# Patient Record
Sex: MALE | Race: WHITE | Employment: UNEMPLOYED | URBAN - METROPOLITAN AREA
[De-identification: names, ages, dates, MRNs, and addresses within clinical notes are randomized per-mention and may not be internally consistent; named-entity substitution may affect disease eponyms.]

---

## 2021-09-15 ENCOUNTER — HOSPITAL ENCOUNTER (INPATIENT)
Age: 61
LOS: 6 days | Discharge: NURSING, REHAB OR LONG-TERM CARE | DRG: 480 | End: 2021-09-21
Attending: EMERGENCY MEDICINE | Admitting: INTERNAL MEDICINE
Payer: COMMERCIAL

## 2021-09-15 ENCOUNTER — APPOINTMENT (OUTPATIENT)
Dept: GENERAL RADIOLOGY | Age: 61
DRG: 480 | End: 2021-09-15
Payer: COMMERCIAL

## 2021-09-15 DIAGNOSIS — S72.001A CLOSED FRACTURE OF NECK OF RIGHT FEMUR, INITIAL ENCOUNTER (HCC): Primary | ICD-10-CM

## 2021-09-15 DIAGNOSIS — G89.18 POST-OP PAIN: ICD-10-CM
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
## 2021-09-15 PROBLEM — I10 HTN (HYPERTENSION): Status: ACTIVE | Noted: 2021-09-15

## 2021-09-15 PROBLEM — C61 PROSTATE CANCER (HCC): Status: ACTIVE | Noted: 2021-09-15
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
## 2021-09-15 LAB
ABO/RH: NORMAL
ALBUMIN SERPL-MCNC: 4.4 G/DL (ref 3.5–4.6)
ALP BLD-CCNC: 114 U/L (ref 35–104)
ALT SERPL-CCNC: 25 U/L (ref 0–41)
ANION GAP SERPL CALCULATED.3IONS-SCNC: 12 MEQ/L (ref 9–15)
ANTIBODY SCREEN: NORMAL
AST SERPL-CCNC: 27 U/L (ref 0–40)
BASOPHILS ABSOLUTE: 0 K/UL (ref 0–0.2)
BASOPHILS RELATIVE PERCENT: 0.3 %
BILIRUB SERPL-MCNC: 0.3 MG/DL (ref 0.2–0.7)
BUN BLDV-MCNC: 15 MG/DL (ref 8–23)
CALCIUM SERPL-MCNC: 9.3 MG/DL (ref 8.5–9.9)
CHLORIDE BLD-SCNC: 104 MEQ/L (ref 95–107)
CO2: 25 MEQ/L (ref 20–31)
CREAT SERPL-MCNC: 0.91 MG/DL (ref 0.7–1.2)
EOSINOPHILS ABSOLUTE: 0.1 K/UL (ref 0–0.7)
EOSINOPHILS RELATIVE PERCENT: 0.6 %
GFR AFRICAN AMERICAN: >60
GFR NON-AFRICAN AMERICAN: >60
GLOBULIN: 2.6 G/DL (ref 2.3–3.5)
GLUCOSE BLD-MCNC: 112 MG/DL (ref 70–99)
HCT VFR BLD CALC: 42.3 % (ref 42–52)
HEMOGLOBIN: 14.1 G/DL (ref 14–18)
INR BLD: 1
LYMPHOCYTES ABSOLUTE: 1.9 K/UL (ref 1–4.8)
LYMPHOCYTES RELATIVE PERCENT: 13 %
MCH RBC QN AUTO: 30.3 PG (ref 27–31.3)
MCHC RBC AUTO-ENTMCNC: 33.4 % (ref 33–37)
MCV RBC AUTO: 90.8 FL (ref 80–100)
MONOCYTES ABSOLUTE: 1 K/UL (ref 0.2–0.8)
MONOCYTES RELATIVE PERCENT: 6.6 %
NEUTROPHILS ABSOLUTE: 11.9 K/UL (ref 1.4–6.5)
NEUTROPHILS RELATIVE PERCENT: 79.5 %
PDW BLD-RTO: 12.6 % (ref 11.5–14.5)
PLATELET # BLD: 260 K/UL (ref 130–400)
POTASSIUM SERPL-SCNC: 4.2 MEQ/L (ref 3.4–4.9)
PROTHROMBIN TIME: 13.3 SEC (ref 12.3–14.9)
RBC # BLD: 4.66 M/UL (ref 4.7–6.1)
SODIUM BLD-SCNC: 141 MEQ/L (ref 135–144)
TOTAL PROTEIN: 7 G/DL (ref 6.3–8)
WBC # BLD: 15 K/UL (ref 4.8–10.8)

## 2021-09-15 PROCEDURE — 99284 EMERGENCY DEPT VISIT MOD MDM: CPT

## 2021-09-15 PROCEDURE — 93005 ELECTROCARDIOGRAM TRACING: CPT | Performed by: EMERGENCY MEDICINE

## 2021-09-15 PROCEDURE — 80053 COMPREHEN METABOLIC PANEL: CPT

## 2021-09-15 PROCEDURE — 36415 COLL VENOUS BLD VENIPUNCTURE: CPT

## 2021-09-15 PROCEDURE — 73502 X-RAY EXAM HIP UNI 2-3 VIEWS: CPT

## 2021-09-15 PROCEDURE — 71045 X-RAY EXAM CHEST 1 VIEW: CPT

## 2021-09-15 PROCEDURE — 73552 X-RAY EXAM OF FEMUR 2/>: CPT

## 2021-09-15 PROCEDURE — 85610 PROTHROMBIN TIME: CPT

## 2021-09-15 PROCEDURE — 6360000002 HC RX W HCPCS: Performed by: EMERGENCY MEDICINE

## 2021-09-15 PROCEDURE — 85025 COMPLETE CBC W/AUTO DIFF WBC: CPT

## 2021-09-15 PROCEDURE — 96374 THER/PROPH/DIAG INJ IV PUSH: CPT

## 2021-09-15 PROCEDURE — 96375 TX/PRO/DX INJ NEW DRUG ADDON: CPT

## 2021-09-15 PROCEDURE — 86850 RBC ANTIBODY SCREEN: CPT

## 2021-09-15 PROCEDURE — 86900 BLOOD TYPING SEROLOGIC ABO: CPT

## 2021-09-15 PROCEDURE — 1210000000 HC MED SURG R&B

## 2021-09-15 PROCEDURE — 86901 BLOOD TYPING SEROLOGIC RH(D): CPT

## 2021-09-15 PROCEDURE — 2580000003 HC RX 258: Performed by: EMERGENCY MEDICINE

## 2021-09-15 RX ORDER — ONDANSETRON 2 MG/ML
4 INJECTION INTRAMUSCULAR; INTRAVENOUS ONCE
Status: COMPLETED | OUTPATIENT
Start: 2021-09-15 | End: 2021-09-15

## 2021-09-15 RX ORDER — SODIUM CHLORIDE 0.9 % (FLUSH) 0.9 %
5-40 SYRINGE (ML) INJECTION PRN
Status: DISCONTINUED | OUTPATIENT
Start: 2021-09-15 | End: 2021-09-21 | Stop reason: HOSPADM

## 2021-09-15 RX ORDER — SODIUM CHLORIDE 9 MG/ML
25 INJECTION, SOLUTION INTRAVENOUS PRN
Status: DISCONTINUED | OUTPATIENT
Start: 2021-09-15 | End: 2021-09-21 | Stop reason: HOSPADM

## 2021-09-15 RX ORDER — MORPHINE SULFATE 2 MG/ML
2 INJECTION, SOLUTION INTRAMUSCULAR; INTRAVENOUS
Status: DISCONTINUED | OUTPATIENT
Start: 2021-09-15 | End: 2021-09-21 | Stop reason: HOSPADM

## 2021-09-15 RX ORDER — ONDANSETRON 4 MG/1
4 TABLET, ORALLY DISINTEGRATING ORAL EVERY 8 HOURS PRN
Status: DISCONTINUED | OUTPATIENT
Start: 2021-09-15 | End: 2021-09-21 | Stop reason: HOSPADM

## 2021-09-15 RX ORDER — MORPHINE SULFATE 4 MG/ML
6 INJECTION, SOLUTION INTRAMUSCULAR; INTRAVENOUS ONCE
Status: COMPLETED | OUTPATIENT
Start: 2021-09-15 | End: 2021-09-15

## 2021-09-15 RX ORDER — SODIUM CHLORIDE 0.9 % (FLUSH) 0.9 %
5-40 SYRINGE (ML) INJECTION EVERY 12 HOURS SCHEDULED
Status: DISCONTINUED | OUTPATIENT
Start: 2021-09-15 | End: 2021-09-21 | Stop reason: HOSPADM

## 2021-09-15 RX ORDER — POLYETHYLENE GLYCOL 3350 17 G/17G
17 POWDER, FOR SOLUTION ORAL DAILY PRN
Status: DISCONTINUED | OUTPATIENT
Start: 2021-09-15 | End: 2021-09-21 | Stop reason: HOSPADM

## 2021-09-15 RX ORDER — 0.9 % SODIUM CHLORIDE 0.9 %
1000 INTRAVENOUS SOLUTION INTRAVENOUS ONCE
Status: COMPLETED | OUTPATIENT
Start: 2021-09-15 | End: 2021-09-15

## 2021-09-15 RX ORDER — ONDANSETRON 2 MG/ML
4 INJECTION INTRAMUSCULAR; INTRAVENOUS EVERY 6 HOURS PRN
Status: DISCONTINUED | OUTPATIENT
Start: 2021-09-15 | End: 2021-09-21 | Stop reason: HOSPADM

## 2021-09-15 RX ADMIN — MORPHINE SULFATE 6 MG: 4 INJECTION INTRAVENOUS at 20:33

## 2021-09-15 RX ADMIN — SODIUM CHLORIDE 1000 ML: 9 INJECTION, SOLUTION INTRAVENOUS at 19:21

## 2021-09-15 RX ADMIN — ONDANSETRON 4 MG: 2 INJECTION INTRAMUSCULAR; INTRAVENOUS at 19:21

## 2021-09-15 RX ADMIN — MORPHINE SULFATE 6 MG: 4 INJECTION INTRAVENOUS at 19:22

## 2021-09-15 ASSESSMENT — PAIN SCALES - GENERAL
PAINLEVEL_OUTOF10: 10
PAINLEVEL_OUTOF10: 1
PAINLEVEL_OUTOF10: 10
PAINLEVEL_OUTOF10: 2

## 2021-09-15 ASSESSMENT — PAIN DESCRIPTION - ONSET: ONSET: ON-GOING

## 2021-09-15 ASSESSMENT — ENCOUNTER SYMPTOMS
DIARRHEA: 0
ABDOMINAL PAIN: 0
WHEEZING: 0
TROUBLE SWALLOWING: 0
CHEST TIGHTNESS: 0
EYES NEGATIVE: 1
NAUSEA: 0
CONSTIPATION: 0
SORE THROAT: 0
SHORTNESS OF BREATH: 0
COUGH: 0
VOMITING: 0

## 2021-09-15 ASSESSMENT — PAIN DESCRIPTION - FREQUENCY: FREQUENCY: INTERMITTENT

## 2021-09-15 ASSESSMENT — PAIN DESCRIPTION - LOCATION: LOCATION: HIP;GROIN

## 2021-09-15 ASSESSMENT — PAIN DESCRIPTION - PAIN TYPE: TYPE: ACUTE PAIN

## 2021-09-15 ASSESSMENT — PAIN DESCRIPTION - PROGRESSION: CLINICAL_PROGRESSION: NOT CHANGED

## 2021-09-15 NOTE — Clinical Note
Patient Class: Inpatient [101]   REQUIRED: Diagnosis: Fall at home, initial encounter [[id number]]   Estimated Length of Stay: Estimated stay of more than 2 midnights   Admitting Provider: Nish Mullins [[id number]]   Telemetry/Cardiac Monitoring Required?: Yes

## 2021-09-16 ENCOUNTER — ANESTHESIA (OUTPATIENT)
Dept: OPERATING ROOM | Age: 61
DRG: 480 | End: 2021-09-16
Payer: COMMERCIAL

## 2021-09-16 ENCOUNTER — APPOINTMENT (OUTPATIENT)
Dept: GENERAL RADIOLOGY | Age: 61
DRG: 480 | End: 2021-09-16
Payer: COMMERCIAL

## 2021-09-16 ENCOUNTER — ANESTHESIA EVENT (OUTPATIENT)
Dept: OPERATING ROOM | Age: 61
DRG: 480 | End: 2021-09-16
Payer: COMMERCIAL

## 2021-09-16 VITALS — OXYGEN SATURATION: 90 % | TEMPERATURE: 90 F | SYSTOLIC BLOOD PRESSURE: 134 MMHG | DIASTOLIC BLOOD PRESSURE: 65 MMHG

## 2021-09-16 LAB
EKG ATRIAL RATE: 89 BPM
EKG P AXIS: 47 DEGREES
EKG P-R INTERVAL: 136 MS
EKG Q-T INTERVAL: 400 MS
EKG QRS DURATION: 142 MS
EKG QTC CALCULATION (BAZETT): 486 MS
EKG R AXIS: 80 DEGREES
EKG T AXIS: 43 DEGREES
EKG VENTRICULAR RATE: 89 BPM
SARS-COV-2, NAAT: NOT DETECTED

## 2021-09-16 PROCEDURE — C1713 ANCHOR/SCREW BN/BN,TIS/BN: HCPCS | Performed by: ORTHOPAEDIC SURGERY

## 2021-09-16 PROCEDURE — 6360000002 HC RX W HCPCS: Performed by: STUDENT IN AN ORGANIZED HEALTH CARE EDUCATION/TRAINING PROGRAM

## 2021-09-16 PROCEDURE — 93010 ELECTROCARDIOGRAM REPORT: CPT | Performed by: INTERNAL MEDICINE

## 2021-09-16 PROCEDURE — 6370000000 HC RX 637 (ALT 250 FOR IP): Performed by: ORTHOPAEDIC SURGERY

## 2021-09-16 PROCEDURE — 6360000002 HC RX W HCPCS: Performed by: NURSE PRACTITIONER

## 2021-09-16 PROCEDURE — 3700000000 HC ANESTHESIA ATTENDED CARE: Performed by: ORTHOPAEDIC SURGERY

## 2021-09-16 PROCEDURE — 3209999900 FLUORO FOR SURGICAL PROCEDURES

## 2021-09-16 PROCEDURE — 2580000003 HC RX 258: Performed by: STUDENT IN AN ORGANIZED HEALTH CARE EDUCATION/TRAINING PROGRAM

## 2021-09-16 PROCEDURE — 3E0T3BZ INTRODUCTION OF ANESTHETIC AGENT INTO PERIPHERAL NERVES AND PLEXI, PERCUTANEOUS APPROACH: ICD-10-PCS | Performed by: STUDENT IN AN ORGANIZED HEALTH CARE EDUCATION/TRAINING PROGRAM

## 2021-09-16 PROCEDURE — 87635 SARS-COV-2 COVID-19 AMP PRB: CPT

## 2021-09-16 PROCEDURE — 3600000004 HC SURGERY LEVEL 4 BASE: Performed by: ORTHOPAEDIC SURGERY

## 2021-09-16 PROCEDURE — 3600000014 HC SURGERY LEVEL 4 ADDTL 15MIN: Performed by: ORTHOPAEDIC SURGERY

## 2021-09-16 PROCEDURE — 76942 ECHO GUIDE FOR BIOPSY: CPT | Performed by: STUDENT IN AN ORGANIZED HEALTH CARE EDUCATION/TRAINING PROGRAM

## 2021-09-16 PROCEDURE — C1769 GUIDE WIRE: HCPCS | Performed by: ORTHOPAEDIC SURGERY

## 2021-09-16 PROCEDURE — 0QH604Z INSERTION OF INTERNAL FIXATION DEVICE INTO RIGHT UPPER FEMUR, OPEN APPROACH: ICD-10-PCS | Performed by: ORTHOPAEDIC SURGERY

## 2021-09-16 PROCEDURE — 94150 VITAL CAPACITY TEST: CPT

## 2021-09-16 PROCEDURE — 99221 1ST HOSP IP/OBS SF/LOW 40: CPT | Performed by: ORTHOPAEDIC SURGERY

## 2021-09-16 PROCEDURE — 2500000003 HC RX 250 WO HCPCS: Performed by: STUDENT IN AN ORGANIZED HEALTH CARE EDUCATION/TRAINING PROGRAM

## 2021-09-16 PROCEDURE — 27236 TREAT THIGH FRACTURE: CPT | Performed by: ORTHOPAEDIC SURGERY

## 2021-09-16 PROCEDURE — 2580000003 HC RX 258: Performed by: NURSE PRACTITIONER

## 2021-09-16 PROCEDURE — 7100000001 HC PACU RECOVERY - ADDTL 15 MIN: Performed by: ORTHOPAEDIC SURGERY

## 2021-09-16 PROCEDURE — 2709999900 HC NON-CHARGEABLE SUPPLY: Performed by: ORTHOPAEDIC SURGERY

## 2021-09-16 PROCEDURE — 3700000001 HC ADD 15 MINUTES (ANESTHESIA): Performed by: ORTHOPAEDIC SURGERY

## 2021-09-16 PROCEDURE — 2580000003 HC RX 258: Performed by: ORTHOPAEDIC SURGERY

## 2021-09-16 PROCEDURE — 1210000000 HC MED SURG R&B

## 2021-09-16 PROCEDURE — 7100000000 HC PACU RECOVERY - FIRST 15 MIN: Performed by: ORTHOPAEDIC SURGERY

## 2021-09-16 DEVICE — SCREW BONE L44MM DIA5MM TI ALLOY LCK ST W/ T25 STARDRV: Type: IMPLANTABLE DEVICE | Site: FEMUR | Status: FUNCTIONAL

## 2021-09-16 DEVICE — PLATE BONE 1 H TI ALLOY FOR FEM NK SYS: Type: IMPLANTABLE DEVICE | Site: FEMUR | Status: FUNCTIONAL

## 2021-09-16 DEVICE — SCREW BONE L100MM DIA6.4MM BLU TI ALLOY ANTIROTATION T25: Type: IMPLANTABLE DEVICE | Site: FEMUR | Status: FUNCTIONAL

## 2021-09-16 RX ORDER — ONDANSETRON 2 MG/ML
4 INJECTION INTRAMUSCULAR; INTRAVENOUS
Status: DISCONTINUED | OUTPATIENT
Start: 2021-09-16 | End: 2021-09-16 | Stop reason: HOSPADM

## 2021-09-16 RX ORDER — HYDROCODONE BITARTRATE AND ACETAMINOPHEN 5; 325 MG/1; MG/1
2 TABLET ORAL PRN
Status: DISCONTINUED | OUTPATIENT
Start: 2021-09-16 | End: 2021-09-16 | Stop reason: HOSPADM

## 2021-09-16 RX ORDER — ROPIVACAINE HYDROCHLORIDE 5 MG/ML
INJECTION, SOLUTION EPIDURAL; INFILTRATION; PERINEURAL
Status: COMPLETED | OUTPATIENT
Start: 2021-09-16 | End: 2021-09-16

## 2021-09-16 RX ORDER — SODIUM CHLORIDE 0.9 % (FLUSH) 0.9 %
10 SYRINGE (ML) INJECTION EVERY 12 HOURS SCHEDULED
Status: DISCONTINUED | OUTPATIENT
Start: 2021-09-16 | End: 2021-09-16 | Stop reason: HOSPADM

## 2021-09-16 RX ORDER — SODIUM CHLORIDE 9 MG/ML
25 INJECTION, SOLUTION INTRAVENOUS PRN
Status: DISCONTINUED | OUTPATIENT
Start: 2021-09-16 | End: 2021-09-21 | Stop reason: HOSPADM

## 2021-09-16 RX ORDER — SODIUM CHLORIDE 0.9 % (FLUSH) 0.9 %
10 SYRINGE (ML) INJECTION EVERY 12 HOURS SCHEDULED
Status: DISCONTINUED | OUTPATIENT
Start: 2021-09-16 | End: 2021-09-21 | Stop reason: HOSPADM

## 2021-09-16 RX ORDER — ACETAMINOPHEN 325 MG/1
650 TABLET ORAL EVERY 6 HOURS
Status: DISCONTINUED | OUTPATIENT
Start: 2021-09-16 | End: 2021-09-21 | Stop reason: HOSPADM

## 2021-09-16 RX ORDER — OXYCODONE HYDROCHLORIDE 5 MG/1
5 TABLET ORAL EVERY 4 HOURS PRN
Status: DISCONTINUED | OUTPATIENT
Start: 2021-09-16 | End: 2021-09-17

## 2021-09-16 RX ORDER — DIPHENHYDRAMINE HYDROCHLORIDE 50 MG/ML
12.5 INJECTION INTRAMUSCULAR; INTRAVENOUS
Status: DISCONTINUED | OUTPATIENT
Start: 2021-09-16 | End: 2021-09-16 | Stop reason: HOSPADM

## 2021-09-16 RX ORDER — SODIUM CHLORIDE 0.9 % (FLUSH) 0.9 %
10 SYRINGE (ML) INJECTION PRN
Status: DISCONTINUED | OUTPATIENT
Start: 2021-09-16 | End: 2021-09-16 | Stop reason: HOSPADM

## 2021-09-16 RX ORDER — MEPERIDINE HYDROCHLORIDE 25 MG/ML
12.5 INJECTION INTRAMUSCULAR; INTRAVENOUS; SUBCUTANEOUS EVERY 5 MIN PRN
Status: DISCONTINUED | OUTPATIENT
Start: 2021-09-16 | End: 2021-09-16 | Stop reason: HOSPADM

## 2021-09-16 RX ORDER — METOCLOPRAMIDE HYDROCHLORIDE 5 MG/ML
10 INJECTION INTRAMUSCULAR; INTRAVENOUS
Status: DISCONTINUED | OUTPATIENT
Start: 2021-09-16 | End: 2021-09-16 | Stop reason: HOSPADM

## 2021-09-16 RX ORDER — SODIUM CHLORIDE 9 MG/ML
25 INJECTION, SOLUTION INTRAVENOUS PRN
Status: DISCONTINUED | OUTPATIENT
Start: 2021-09-16 | End: 2021-09-16 | Stop reason: HOSPADM

## 2021-09-16 RX ORDER — PROPOFOL 10 MG/ML
INJECTION, EMULSION INTRAVENOUS PRN
Status: DISCONTINUED | OUTPATIENT
Start: 2021-09-16 | End: 2021-09-16 | Stop reason: SDUPTHER

## 2021-09-16 RX ORDER — SODIUM CHLORIDE, SODIUM LACTATE, POTASSIUM CHLORIDE, CALCIUM CHLORIDE 600; 310; 30; 20 MG/100ML; MG/100ML; MG/100ML; MG/100ML
INJECTION, SOLUTION INTRAVENOUS CONTINUOUS PRN
Status: DISCONTINUED | OUTPATIENT
Start: 2021-09-16 | End: 2021-09-16 | Stop reason: SDUPTHER

## 2021-09-16 RX ORDER — LIDOCAINE HYDROCHLORIDE 10 MG/ML
1 INJECTION, SOLUTION EPIDURAL; INFILTRATION; INTRACAUDAL; PERINEURAL
Status: DISCONTINUED | OUTPATIENT
Start: 2021-09-16 | End: 2021-09-16 | Stop reason: HOSPADM

## 2021-09-16 RX ORDER — PREDNISONE 1 MG/1
5 TABLET ORAL DAILY
Status: DISCONTINUED | OUTPATIENT
Start: 2021-09-16 | End: 2021-09-21 | Stop reason: HOSPADM

## 2021-09-16 RX ORDER — SODIUM CHLORIDE, SODIUM LACTATE, POTASSIUM CHLORIDE, CALCIUM CHLORIDE 600; 310; 30; 20 MG/100ML; MG/100ML; MG/100ML; MG/100ML
INJECTION, SOLUTION INTRAVENOUS CONTINUOUS
Status: DISPENSED | OUTPATIENT
Start: 2021-09-16 | End: 2021-09-17

## 2021-09-16 RX ORDER — PREDNISONE 1 MG/1
5 TABLET ORAL DAILY
COMMUNITY

## 2021-09-16 RX ORDER — FENTANYL CITRATE 50 UG/ML
50 INJECTION, SOLUTION INTRAMUSCULAR; INTRAVENOUS EVERY 10 MIN PRN
Status: DISCONTINUED | OUTPATIENT
Start: 2021-09-16 | End: 2021-09-16 | Stop reason: HOSPADM

## 2021-09-16 RX ORDER — MIDAZOLAM HYDROCHLORIDE 1 MG/ML
INJECTION INTRAMUSCULAR; INTRAVENOUS PRN
Status: DISCONTINUED | OUTPATIENT
Start: 2021-09-16 | End: 2021-09-16 | Stop reason: SDUPTHER

## 2021-09-16 RX ORDER — ABIRATERONE ACETATE 250 MG/1
1000 TABLET ORAL DAILY
Status: DISCONTINUED | OUTPATIENT
Start: 2021-09-16 | End: 2021-09-21 | Stop reason: HOSPADM

## 2021-09-16 RX ORDER — SENNA AND DOCUSATE SODIUM 50; 8.6 MG/1; MG/1
1 TABLET, FILM COATED ORAL 2 TIMES DAILY
Status: DISCONTINUED | OUTPATIENT
Start: 2021-09-16 | End: 2021-09-21 | Stop reason: HOSPADM

## 2021-09-16 RX ORDER — SODIUM CHLORIDE 0.9 % (FLUSH) 0.9 %
10 SYRINGE (ML) INJECTION PRN
Status: DISCONTINUED | OUTPATIENT
Start: 2021-09-16 | End: 2021-09-21 | Stop reason: HOSPADM

## 2021-09-16 RX ORDER — BUPIVACAINE HYDROCHLORIDE 5 MG/ML
INJECTION, SOLUTION EPIDURAL; INTRACAUDAL PRN
Status: DISCONTINUED | OUTPATIENT
Start: 2021-09-16 | End: 2021-09-16 | Stop reason: SDUPTHER

## 2021-09-16 RX ORDER — SODIUM CHLORIDE, SODIUM LACTATE, POTASSIUM CHLORIDE, CALCIUM CHLORIDE 600; 310; 30; 20 MG/100ML; MG/100ML; MG/100ML; MG/100ML
INJECTION, SOLUTION INTRAVENOUS CONTINUOUS
Status: DISCONTINUED | OUTPATIENT
Start: 2021-09-16 | End: 2021-09-16

## 2021-09-16 RX ORDER — SODIUM CHLORIDE, SODIUM LACTATE, POTASSIUM CHLORIDE, CALCIUM CHLORIDE 600; 310; 30; 20 MG/100ML; MG/100ML; MG/100ML; MG/100ML
INJECTION, SOLUTION INTRAVENOUS
Status: COMPLETED
Start: 2021-09-16 | End: 2021-09-16

## 2021-09-16 RX ORDER — ABIRATERONE ACETATE 250 MG/1
250 TABLET ORAL DAILY
COMMUNITY

## 2021-09-16 RX ORDER — HYDROCODONE BITARTRATE AND ACETAMINOPHEN 5; 325 MG/1; MG/1
1 TABLET ORAL PRN
Status: DISCONTINUED | OUTPATIENT
Start: 2021-09-16 | End: 2021-09-16 | Stop reason: HOSPADM

## 2021-09-16 RX ADMIN — SODIUM CHLORIDE, POTASSIUM CHLORIDE, SODIUM LACTATE AND CALCIUM CHLORIDE: 600; 310; 30; 20 INJECTION, SOLUTION INTRAVENOUS at 20:31

## 2021-09-16 RX ADMIN — MORPHINE SULFATE 2 MG: 2 INJECTION, SOLUTION INTRAMUSCULAR; INTRAVENOUS at 01:50

## 2021-09-16 RX ADMIN — ACETAMINOPHEN 650 MG: 325 TABLET ORAL at 20:30

## 2021-09-16 RX ADMIN — BUPIVACAINE HYDROCHLORIDE 10 MG: 5 INJECTION, SOLUTION EPIDURAL; INTRACAUDAL at 15:31

## 2021-09-16 RX ADMIN — ONDANSETRON 4 MG: 2 INJECTION INTRAMUSCULAR; INTRAVENOUS at 01:50

## 2021-09-16 RX ADMIN — ROPIVACAINE HYDROCHLORIDE 20 ML: 5 INJECTION, SOLUTION EPIDURAL; INFILTRATION; PERINEURAL at 15:35

## 2021-09-16 RX ADMIN — MIDAZOLAM HYDROCHLORIDE 2 MG: 2 INJECTION, SOLUTION INTRAMUSCULAR; INTRAVENOUS at 15:25

## 2021-09-16 RX ADMIN — PROPOFOL 50 MCG/KG/MIN: 10 INJECTION, EMULSION INTRAVENOUS at 15:43

## 2021-09-16 RX ADMIN — PROPOFOL 50 MG: 10 INJECTION, EMULSION INTRAVENOUS at 15:25

## 2021-09-16 RX ADMIN — CEFAZOLIN 3000 MG: 10 INJECTION, POWDER, FOR SOLUTION INTRAVENOUS at 15:35

## 2021-09-16 RX ADMIN — SODIUM CHLORIDE, PRESERVATIVE FREE 10 ML: 5 INJECTION INTRAVENOUS at 20:30

## 2021-09-16 RX ADMIN — SODIUM CHLORIDE, POTASSIUM CHLORIDE, SODIUM LACTATE AND CALCIUM CHLORIDE: 600; 310; 30; 20 INJECTION, SOLUTION INTRAVENOUS at 15:26

## 2021-09-16 RX ADMIN — DOCUSATE SODIUM 50 MG AND SENNOSIDES 8.6 MG 1 TABLET: 8.6; 5 TABLET, FILM COATED ORAL at 20:30

## 2021-09-16 RX ADMIN — MEPERIDINE HYDROCHLORIDE 12.5 MG: 25 INJECTION, SOLUTION INTRAMUSCULAR; INTRAVENOUS; SUBCUTANEOUS at 17:34

## 2021-09-16 ASSESSMENT — PULMONARY FUNCTION TESTS
PIF_VALUE: 1
PIF_VALUE: 0
PIF_VALUE: 1
PIF_VALUE: 0
PIF_VALUE: 1
PIF_VALUE: 0
PIF_VALUE: 1
PIF_VALUE: 0
PIF_VALUE: 1
PIF_VALUE: 0
PIF_VALUE: 1
PIF_VALUE: 0
PIF_VALUE: 1
PIF_VALUE: 0
PIF_VALUE: 1
PIF_VALUE: 1
PIF_VALUE: 0
PIF_VALUE: 1
PIF_VALUE: 0
PIF_VALUE: 1

## 2021-09-16 ASSESSMENT — PAIN SCALES - GENERAL
PAINLEVEL_OUTOF10: 0
PAINLEVEL_OUTOF10: 0
PAINLEVEL_OUTOF10: 3

## 2021-09-16 NOTE — ANESTHESIA PRE PROCEDURE
Department of Anesthesiology  Preprocedure Note       Name:  Randal Cody   Age:  59 y.o.  :  1960                                          MRN:  [id number]         Date:  2021      Surgeon: Bernadette Leyva):  Sedrick Ventura MD    Procedure: Procedure(s): FEMORAL NECK PLATING RIGHT HIP SYNTHES ROOM 267    Medications prior to admission:   Prior to Admission medications    Not on File       Current medications:    Current Facility-Administered Medications   Medication Dose Route Frequency Provider Last Rate Last Admin    lactated ringers infusion             sodium chloride flush 0.9 % injection 5-40 mL  5-40 mL IntraVENous 2 times per day Clover Melissa, APRN - CNP        sodium chloride flush 0.9 % injection 5-40 mL  5-40 mL IntraVENous PRN Clover Melissa, APRN - CNP        0.9 % sodium chloride infusion  25 mL IntraVENous PRN Clover Melissa, APRN - CNP        [Held by provider] enoxaparin (LOVENOX) injection 40 mg  40 mg SubCUTAneous Daily Clover Torres, APRN - CNP        ondansetron (ZOFRAN-ODT) disintegrating tablet 4 mg  4 mg Oral Q8H PRN Clover Melissa, APRN - CNP        Or    ondansetron [facility]) injection 4 mg  4 mg IntraVENous Q6H PRN Clover Melissa, APRN - CNP   4 mg at 21 0150    polyethylene glycol (GLYCOLAX) packet 17 g  17 g Oral Daily PRN Cornell Berman, APRN - CNP        ceFAZolin (ANCEF) 3000 mg in dextrose 5 % 100 mL IVPB  3,000 mg IntraVENous On Call to [address] [place] [address], APRN - CNP        morphine (PF) injection 2 mg  2 mg IntraVENous Q2H PRN [place] Torres, APRN - CNP   2 mg at 21 0150       Allergies:  No Known Allergies    Problem List:    Patient Active Problem List   Diagnosis Code    Fall at home, initial encounter W19. Milagros Conn, Y92.009    Closed fracture of neck of right femur ([facility] [address] S72.001A    Prostate cancer ([facility] [address] C61    HTN (hypertension) I10       Past Medical History: Date    PROTIME 13.3 09/15/2021    INR 1.0 09/15/2021       HCG (If Applicable): No results found for: PREGTESTUR, PREGSERUM, HCG, HCGQUANT     ABGs: No results found for: PHART, PO2ART, GBW8EHJ, ARK3XGI, BEART, J0WRSKBH     Type & Screen (If Applicable):  No results found for: LABABO, LABRH    Drug/Infectious Status (If Applicable):  No results found for: HIV, HEPCAB    COVID-19 Screening (If Applicable):   Lab Results   Component Value Date    COVID19 Not Detected 09/16/2021           Anesthesia Evaluation  Patient summary reviewed and Nursing notes reviewed no history of anesthetic complications:   Airway: Mallampati: II  TM distance: >3 FB   Neck ROM: full  Mouth opening: > = 3 FB Dental: normal exam         Pulmonary:Negative Pulmonary ROS and normal exam                               Cardiovascular:  Exercise tolerance: good (>4 METS),   (+) hypertension:,       ECG reviewed               Beta Blocker:  Not on Beta Blocker      ROS comment: Normal sinus rhythm  Right bundle branch block  Abnormal ECG  No previous ECGs available     Neuro/Psych:   Negative Neuro/Psych ROS              GI/Hepatic/Renal: Neg GI/Hepatic/Renal ROS  (+) morbid obesity         ROS comment: BMI 39. Endo/Other:    (+) malignancy/cancer. Pt had PAT visit. Abdominal:             Vascular: negative vascular ROS. Other Findings:             Anesthesia Plan      spinal and regional     ASA 2 - emergent     (Bupivicaine 0.5% 10 mg   PENG  Discussed risk / benefit of spinal anesthesia versus general anesthesia. Discussed risk of bleeding, bruising, infection and nerve injury.)  Induction: intravenous. MIPS: Prophylactic antiemetics administered. Anesthetic plan and risks discussed with patient. Plan discussed with CRNA.     Attending anesthesiologist reviewed and agrees with Ronn Esparza DO   9/16/2021

## 2021-09-16 NOTE — ANESTHESIA PROCEDURE NOTES
Peripheral Block    Patient location during procedure: pre-op  Start time: 9/16/2021 3:26 PM  End time: 9/16/2021 3:35 PM  Staffing  Performed: anesthesiologist   Anesthesiologist: Yumiko Bey DO  Preanesthetic Checklist  Completed: patient identified, IV checked, site marked, risks and benefits discussed, surgical consent, monitors and equipment checked, pre-op evaluation, timeout performed, anesthesia consent given, oxygen available and patient being monitored  Peripheral Block  Patient position: supine  Prep: ChloraPrep  Patient monitoring: cardiac monitor, continuous pulse ox, frequent blood pressure checks and IV access  Block type: PENG  Laterality: right  Injection technique: single-shot  Guidance: ultrasound guided  Local infiltration: ropivacaine  Infiltration strength: 0.5 %  Dose: 20 mL  Provider prep: mask and sterile gloves (Sterile probe cover)  Local infiltration: ropivacaine  Needle  Needle type: combined needle/nerve stimulator   Needle gauge: 22 G  Needle length: 10 cm  Needle localization: anatomical landmarks and ultrasound guidance  Assessment  Injection assessment: negative aspiration for heme, no paresthesia on injection and local visualized surrounding nerve on ultrasound  Paresthesia pain: immediately resolved  Slow fractionated injection: yes  Hemodynamics: stable  Additional Notes  Ultrasound image printed and saved in patient chart.     Sterile probe cover used    Medications Administered  Ropivacaine (NAROPIN) injection 0.5%, 20 mL  Reason for block: post-op pain management and at surgeon's request

## 2021-09-16 NOTE — PROGRESS NOTES
Dr. Mega Delacruz in to visit with patient. Consent reviewed with patient and written out by Dr. Mega Delacruz. Patient did verbalize procedure and understands.

## 2021-09-16 NOTE — ANESTHESIA PROCEDURE NOTES
Spinal Block    Patient location during procedure: pre-op  Start time: 9/16/2021 3:26 PM  End time: 9/16/2021 3:34 PM  Reason for block: primary anesthetic  Staffing  Performed: anesthesiologist   Anesthesiologist: Yumiko Bey DO  Preanesthetic Checklist  Completed: patient identified, IV checked, site marked, risks and benefits discussed, surgical consent, monitors and equipment checked, pre-op evaluation, timeout performed, anesthesia consent given, oxygen available and patient being monitored  Spinal Block  Patient position: right lateral decubitus  Prep: Betadine  Patient monitoring: cardiac monitor, continuous pulse ox and frequent blood pressure checks  Approach: midline  Location: L4/L5  Provider prep: mask and sterile gloves  Local infiltration: lidocaine  Agent: bupivacaine (Isobaric bupivicain 0.5%)  Dose: 2  Dose: 2  Needle  Needle type: Pencan   Needle gauge: 22 G  Needle length: 3.5 in  Assessment  Sensory level: T6  Events: cerebrospinal fluid  Lysis level: CSF aspirated. CSF: clear  Attempts: 1  Hemodynamics: stable  Additional Notes  Free flowing CSF. Negative heme. Negative paresthesia. Daniel Zepeda
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
## 2021-09-16 NOTE — ANESTHESIA POSTPROCEDURE EVALUATION
Department of Anesthesiology  Postprocedure Note    Patient: Ni Mc  MRN: [id number]  [place]: 1960  Date of evaluation: 9/16/2021  Time:  5:01 PM     Procedure Summary     Date: 09/16/21 Room / Location: [address]    Anesthesia Start: 8767 Anesthesia Stop:     Procedure: FEMORAL NECK PLATING RIGHT HIP SYNTHES ROOM 267 (Right ) Diagnosis: (CLOSED FRACTURE OF NECK RIGHT FEMUR)    Surgeons: Hailey Mcclelland MD Responsible Provider: Boom Coombs DO    Anesthesia Type: spinal, regional ASA Status: 2 - Emergent          Anesthesia Type: spinal, PENG    Marilee Phase I:   10    Marilee Phase II:      Last vitals: Reviewed and per EMR flowsheets.        Anesthesia Post Evaluation    Patient location during evaluation: bedside  Patient participation: complete - patient participated  Level of consciousness: awake and awake and alert  Pain score: 0  Airway patency: patent  Nausea & Vomiting: no nausea and no vomiting  Complications: no  Cardiovascular status: blood pressure returned to baseline and hemodynamically stable  Respiratory status: acceptable  Hydration status: euvolemic
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
## 2021-09-16 NOTE — PROGRESS NOTES
Pt had a spinal and nerve block pt is starting to slightly be able to move his toes, still has decrease sensation, no c/o pain, RLE warm to touch and pulses easily palpated at 3+

## 2021-09-16 NOTE — FLOWSHEET NOTE
1830- Pt arrived from surgery. Wife at bedside. Vitals stable. Right thigh tegaderm intact. Pt able to wiggle his toes and has sensation. Pt denies any pain or complaints. Called for dinner tray    1915- Pt resting in bed comfortably. No distress noted.  Wife at [address] [place] [address] handoff report given to [facility] Electronically signed by Irma Solorio RN on 9/16/2021 at 7:45 PM
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
## 2021-09-17 ENCOUNTER — APPOINTMENT (OUTPATIENT)
Dept: CT IMAGING | Age: 61
DRG: 480 | End: 2021-09-17
Payer: COMMERCIAL

## 2021-09-17 PROBLEM — E78.2 MIXED HYPERLIPIDEMIA: Status: ACTIVE | Noted: 2021-09-17

## 2021-09-17 PROBLEM — C79.51 SECONDARY MALIGNANT NEOPLASM OF BONE (HCC): Status: ACTIVE | Noted: 2019-02-01

## 2021-09-17 PROBLEM — C79.82 METASTATIC ADENOCARCINOMA TO PROSTATE (HCC): Status: ACTIVE | Noted: 2019-04-05

## 2021-09-17 PROBLEM — I10 ESSENTIAL HYPERTENSION: Status: ACTIVE | Noted: 2018-08-01

## 2021-09-17 PROBLEM — R97.20 ELEVATED PROSTATE SPECIFIC ANTIGEN (PSA): Status: ACTIVE | Noted: 2018-07-25

## 2021-09-17 PROBLEM — R26.9 GAIT ABNORMALITY: Status: ACTIVE | Noted: 2021-09-17

## 2021-09-17 LAB
ANION GAP SERPL CALCULATED.3IONS-SCNC: 12 MEQ/L (ref 9–15)
BUN BLDV-MCNC: 13 MG/DL (ref 8–23)
CALCIUM SERPL-MCNC: 8.3 MG/DL (ref 8.5–9.9)
CHLORIDE BLD-SCNC: 96 MEQ/L (ref 95–107)
CO2: 21 MEQ/L (ref 20–31)
CREAT SERPL-MCNC: 0.83 MG/DL (ref 0.7–1.2)
GFR AFRICAN AMERICAN: >60
GFR NON-AFRICAN AMERICAN: >60
GLUCOSE BLD-MCNC: 154 MG/DL (ref 70–99)
HCT VFR BLD CALC: 35.7 % (ref 42–52)
HEMOGLOBIN: 12.2 G/DL (ref 14–18)
MCH RBC QN AUTO: 31.3 PG (ref 27–31.3)
MCHC RBC AUTO-ENTMCNC: 34.1 % (ref 33–37)
MCV RBC AUTO: 91.6 FL (ref 80–100)
PDW BLD-RTO: 12.5 % (ref 11.5–14.5)
PLATELET # BLD: 229 K/UL (ref 130–400)
POTASSIUM SERPL-SCNC: 3.3 MEQ/L (ref 3.4–4.9)
RBC # BLD: 3.89 M/UL (ref 4.7–6.1)
SODIUM BLD-SCNC: 129 MEQ/L (ref 135–144)
WBC # BLD: 12.8 K/UL (ref 4.8–10.8)

## 2021-09-17 PROCEDURE — 85027 COMPLETE CBC AUTOMATED: CPT

## 2021-09-17 PROCEDURE — 2580000003 HC RX 258: Performed by: ORTHOPAEDIC SURGERY

## 2021-09-17 PROCEDURE — XW03396 INTRODUCTION OF CEFTOLOZANE/TAZOBACTAM ANTI-INFECTIVE INTO PERIPHERAL VEIN, PERCUTANEOUS APPROACH, NEW TECHNOLOGY GROUP 6: ICD-10-PCS | Performed by: INTERNAL MEDICINE

## 2021-09-17 PROCEDURE — 6360000002 HC RX W HCPCS: Performed by: ORTHOPAEDIC SURGERY

## 2021-09-17 PROCEDURE — 6370000000 HC RX 637 (ALT 250 FOR IP): Performed by: ORTHOPAEDIC SURGERY

## 2021-09-17 PROCEDURE — 6360000002 HC RX W HCPCS: Performed by: INTERNAL MEDICINE

## 2021-09-17 PROCEDURE — 97163 PT EVAL HIGH COMPLEX 45 MIN: CPT

## 2021-09-17 PROCEDURE — 6370000000 HC RX 637 (ALT 250 FOR IP): Performed by: NURSE PRACTITIONER

## 2021-09-17 PROCEDURE — 2700000000 HC OXYGEN THERAPY PER DAY

## 2021-09-17 PROCEDURE — 97116 GAIT TRAINING THERAPY: CPT

## 2021-09-17 PROCEDURE — 6360000004 HC RX CONTRAST MEDICATION: Performed by: NURSE PRACTITIONER

## 2021-09-17 PROCEDURE — 2580000003 HC RX 258: Performed by: INTERNAL MEDICINE

## 2021-09-17 PROCEDURE — 97535 SELF CARE MNGMENT TRAINING: CPT

## 2021-09-17 PROCEDURE — 1210000000 HC MED SURG R&B

## 2021-09-17 PROCEDURE — 71275 CT ANGIOGRAPHY CHEST: CPT

## 2021-09-17 PROCEDURE — 6370000000 HC RX 637 (ALT 250 FOR IP): Performed by: INTERNAL MEDICINE

## 2021-09-17 PROCEDURE — 36415 COLL VENOUS BLD VENIPUNCTURE: CPT

## 2021-09-17 PROCEDURE — 97166 OT EVAL MOD COMPLEX 45 MIN: CPT

## 2021-09-17 PROCEDURE — 80048 BASIC METABOLIC PNL TOTAL CA: CPT

## 2021-09-17 RX ORDER — OXYCODONE HYDROCHLORIDE 5 MG/1
10 TABLET ORAL EVERY 4 HOURS PRN
Status: DISCONTINUED | OUTPATIENT
Start: 2021-09-17 | End: 2021-09-21 | Stop reason: HOSPADM

## 2021-09-17 RX ORDER — FUROSEMIDE 10 MG/ML
40 INJECTION INTRAMUSCULAR; INTRAVENOUS ONCE
Status: COMPLETED | OUTPATIENT
Start: 2021-09-17 | End: 2021-09-17

## 2021-09-17 RX ORDER — POTASSIUM CHLORIDE 20 MEQ/1
40 TABLET, EXTENDED RELEASE ORAL EVERY 4 HOURS
Status: COMPLETED | OUTPATIENT
Start: 2021-09-17 | End: 2021-09-17

## 2021-09-17 RX ORDER — OXYCODONE HYDROCHLORIDE 5 MG/1
5 TABLET ORAL EVERY 4 HOURS PRN
Status: DISCONTINUED | OUTPATIENT
Start: 2021-09-17 | End: 2021-09-21 | Stop reason: HOSPADM

## 2021-09-17 RX ORDER — TIZANIDINE 4 MG/1
4 TABLET ORAL EVERY 6 HOURS PRN
Status: DISCONTINUED | OUTPATIENT
Start: 2021-09-17 | End: 2021-09-21 | Stop reason: HOSPADM

## 2021-09-17 RX ADMIN — ACETAMINOPHEN 650 MG: 325 TABLET ORAL at 00:36

## 2021-09-17 RX ADMIN — DOCUSATE SODIUM 50 MG AND SENNOSIDES 8.6 MG 1 TABLET: 8.6; 5 TABLET, FILM COATED ORAL at 09:19

## 2021-09-17 RX ADMIN — ACETAMINOPHEN 650 MG: 325 TABLET ORAL at 06:21

## 2021-09-17 RX ADMIN — OXYCODONE HYDROCHLORIDE 5 MG: 5 TABLET ORAL at 09:19

## 2021-09-17 RX ADMIN — POTASSIUM CHLORIDE 40 MEQ: 1500 TABLET, EXTENDED RELEASE ORAL at 21:19

## 2021-09-17 RX ADMIN — SODIUM CHLORIDE, PRESERVATIVE FREE 10 ML: 5 INJECTION INTRAVENOUS at 21:23

## 2021-09-17 RX ADMIN — ABIRATERONE ACETATE 1000 MG: 250 TABLET ORAL at 06:21

## 2021-09-17 RX ADMIN — FUROSEMIDE 40 MG: 10 INJECTION, SOLUTION INTRAMUSCULAR; INTRAVENOUS at 15:34

## 2021-09-17 RX ADMIN — PIPERACILLIN AND TAZOBACTAM 3375 MG: 3; .375 INJECTION, POWDER, LYOPHILIZED, FOR SOLUTION INTRAVENOUS at 16:31

## 2021-09-17 RX ADMIN — POTASSIUM CHLORIDE 40 MEQ: 1500 TABLET, EXTENDED RELEASE ORAL at 15:34

## 2021-09-17 RX ADMIN — CEFAZOLIN 3000 MG: 10 INJECTION, POWDER, FOR SOLUTION INTRAVENOUS at 10:01

## 2021-09-17 RX ADMIN — CEFAZOLIN 3000 MG: 10 INJECTION, POWDER, FOR SOLUTION INTRAVENOUS at 00:36

## 2021-09-17 RX ADMIN — ENOXAPARIN SODIUM 40 MG: 40 INJECTION SUBCUTANEOUS at 09:19

## 2021-09-17 RX ADMIN — SODIUM CHLORIDE, PRESERVATIVE FREE 10 ML: 5 INJECTION INTRAVENOUS at 21:22

## 2021-09-17 RX ADMIN — SODIUM CHLORIDE, POTASSIUM CHLORIDE, SODIUM LACTATE AND CALCIUM CHLORIDE: 600; 310; 30; 20 INJECTION, SOLUTION INTRAVENOUS at 18:28

## 2021-09-17 RX ADMIN — IOPAMIDOL 100 ML: 755 INJECTION, SOLUTION INTRAVENOUS at 13:42

## 2021-09-17 RX ADMIN — ACETAMINOPHEN 650 MG: 325 TABLET ORAL at 12:28

## 2021-09-17 RX ADMIN — ACETAMINOPHEN 650 MG: 325 TABLET ORAL at 21:19

## 2021-09-17 RX ADMIN — PREDNISONE 5 MG: 1 TABLET ORAL at 08:19

## 2021-09-17 ASSESSMENT — PAIN DESCRIPTION - DESCRIPTORS
DESCRIPTORS: ACHING;SORE
DESCRIPTORS: ACHING;SORE

## 2021-09-17 ASSESSMENT — PAIN DESCRIPTION - PAIN TYPE
TYPE: SURGICAL PAIN
TYPE: SURGICAL PAIN
TYPE: ACUTE PAIN

## 2021-09-17 ASSESSMENT — PAIN SCALES - GENERAL
PAINLEVEL_OUTOF10: 5
PAINLEVEL_OUTOF10: 1
PAINLEVEL_OUTOF10: 1
PAINLEVEL_OUTOF10: 2
PAINLEVEL_OUTOF10: 5
PAINLEVEL_OUTOF10: 5
PAINLEVEL_OUTOF10: 0
PAINLEVEL_OUTOF10: 1

## 2021-09-17 ASSESSMENT — PAIN DESCRIPTION - ORIENTATION
ORIENTATION: RIGHT

## 2021-09-17 ASSESSMENT — PAIN DESCRIPTION - FREQUENCY: FREQUENCY: INTERMITTENT

## 2021-09-17 ASSESSMENT — PAIN DESCRIPTION - LOCATION
LOCATION: HIP

## 2021-09-17 ASSESSMENT — PAIN - FUNCTIONAL ASSESSMENT: PAIN_FUNCTIONAL_ASSESSMENT: PREVENTS OR INTERFERES WITH MANY ACTIVE NOT PASSIVE ACTIVITIES
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
## 2021-09-17 NOTE — CARE COORDINATION
[facility] Case Management Initial Discharge Assessment    Met with Patient to discuss discharge plan. PCP:         Dr. Kimmie Antonio MD at [facility]                   Date of Last Visit: few weeks     If no PCP, list provided? N/A    Discharge Planning    Living Arrangements: independently at home    Who do you live with? Alone    Who helps you with your care:  {Primary caregiver:88384}    If lives at home:     Do you have any barriers navigating in your home? {YES***/NO:60}    Patient can perform ADL? {YES/NO:19726}    Current Services (outpatient and in home) :  {current services 5:35056}    Dialysis: {dialysis:96559}    Is transportation available to get to your appointments? {yes no ***:58612}    DME Equipment:  {YES***/NO:60}    Respiratory equipment: {respiratory equipment:65270}    Respiratory provider:  {YES***/NO:60}     Pharmacy:  {YES***/NO:60}    Consult with Medication Assistance Program?  {YES/NO:19726}      Patient agreeable to [facility]? {UQA6:61202}    Patient agreeable to SNF/Rehab? {LTH7:53874}    Other discharge needs identified? {NDR4:74107}    Does Patient Have a High-Risk for Readmission Diagnosis (CHF, PN, MI, COPD)? {CIM1:74856}    If Yes,     Consult with pulmonologist? {XGM4:47912}   Consult with cardiologist? {VGU8:37282}   Cardiac Rehab referral if EF <35%? {ZIV9:31659}   Consult with Pharmacy for medication assessment prior to discharge? {SDY9:93290}   Consult with Behavioral health to aid in depression, anxiety, or coping issues? {LQH7:78025}   Palliative Care Consult? {QNC2:99802}   Pulmonary Rehab order for COPD, PN, and CHF (if EF > 35%)? {LDE8:22965}    Does patient have a reliable scale and know how to read it (for CHF)? {BZI9:97357}   Nutrition consult for CHF? {VFB2:24597}   Respiratory therapy consult that includes bedside instruction on administration of nebulizers and/or inhalers, and assessment of oxygen and equipment needs in the home?

## 2021-09-17 NOTE — CARE COORDINATION
ARINW met with the pt and his wife and daughter to discuss his DC needs. Pt lives in [facility] and his daughter is in [place] and his wife is in [place]. They would like to take the pt home if he improves but as of this time he will need a rehab stay per PT/OT. Family would like [facility] acute rehab as he follows with [facility] for cancer treatment. JENNIFER called the referral to Chino Kaur at [facility] rehab but the pt will need a precert before transfer to them. Precert will be started on Monday.

## 2021-09-17 NOTE — PROGRESS NOTES
Physical Therapy Missed Treatment   Facility/Department: [facility] MED SURG E759/J923-04    NAME: Damaris Bryan    : 1960 (59 y.o.)  MRN: [id number]    Account: [de-identified]  Gender: male    Chart reviewed, attempted PT at 26. Patient unavailable 2° to:        [x] Pt. . off floor for test/procedure. [x] Pt. Unavailable CT scan      Will attempt PT treatment again at earliest convenience.       Electronically signed by Misha Callahan PTA on 21 at 1:58 PM EDT
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
## 2021-09-17 NOTE — PLAN OF CARE
Problem: Pain:  Goal: Pain level will decrease  Outcome: Ongoing  Goal: Control of acute pain  Outcome: Ongoing  Goal: Control of chronic pain  Outcome: Ongoing     Problem: IP DRESSINGS LOWER EXTREMITIES  Goal: LTG - Patient will safely utilize adaptive techniques/equipment to dress lower body  9/17/2021 1726 by Laisha Leiva RN  Outcome: Ongoing  9/17/2021 1034 by Marian Hood OTR/L  Outcome: Ongoing     Problem: IP MOBILITY  Goal: LTG - patient will demonstrate safe mobility requirements  9/17/2021 1726 by Laisha Leiva RN  Outcome: Ongoing  9/17/2021 1057 by Jose Guerrero PT  Outcome: Ongoing

## 2021-09-17 NOTE — DISCHARGE INSTR - COC
Continuity of Care Form    Patient Name: Shar Valdez   :  1960  MRN:  [id number]    Admit date:  9/15/2021  Discharge date:  ***    Code Status Order: Full Code   Advance Directives:      Admitting Physician: Juan Ramon Salcido MD  PCP: No primary care provider on file. Discharging Nurse: [facility] Unit/Room#: I077/H369-72  Discharging Unit Phone Number: ***    Emergency Contact:   Extended Emergency Contact Information  Primary Emergency Contact: claudio chandler  Mobile Phone: [phone]  Relation: Spouse    Past Surgical History:  Past Surgical History:   Procedure Laterality Date    HIP SURGERY Right 2021    FEMORAL NECK PLATING RIGHT HIP SYNTHES ROOM 267 performed by Carrie Eng MD at [facility]       Immunization History:   Immunization History   Administered Date(s) Administered    COVID-19, Pfizer, PF, 30mcg/0.3mL 03/15/2021, 2021       Active Problems:  Patient Active Problem List   Diagnosis Code    Fall at home, initial encounter W19. XXXA, Y92.009    Closed fracture of neck of right femur ([address] S72.001A    Metastatic adenocarcinoma to prostate [facility]) C79.82    Essential hypertension I10    Gait abnormality R26.9    Mixed hyperlipidemia E78.2    Elevated prostate specific antigen (PSA) R97.20    Secondary malignant neoplasm of bone (HCC) C79.51       Isolation/Infection:   Isolation            No Isolation          Patient Infection Status       Infection Onset Added Last Indicated Last Indicated By Review Planned Expiration Resolved Resolved By    None active    Resolved    COVID-19 Rule Out 21 COVID-19, Rapid (Ordered)   21 Rule-Out Test Resulted            Nurse Assessment:  Last Vital Signs: BP (!) 150/68   Pulse 78   Temp 99.1 °F (37.3 °C) (Oral)   Resp 18   Ht 5' 11\" (1.803 m)   Wt 276 lb 3.8 oz (125.3 kg)   SpO2 (!) 87%   BMI 38.53 kg/m²     Last documented pain score (0-10 scale): Pain Level: 5  Last Weight:   Wt Readings from Last 1 Encounters:   21 276 lb 3.8 oz (125.3 kg)     Mental Status:  {IP PT MENTAL STATUS::::0}    IV Access:  [employer] IV ACCESS:817394577:::0}    Nursing Mobility/ADLs:  Walking   {CHP DME ADLs:978098743:::0}  Transfer  {CHP DME ADLs:675269193:::0}  Bathing  {CHP DME ADLs:576531153:::0}  Dressing  {CHP DME ADLs:717699405:::0}  Toileting  {CHP DME ADLs:629757706:::0}  Feeding  {CHP DME ADLs:892152964:::0}  Med Admin  {CHP DME ADLs:546690353:::0}  Med Delivery   {[facility] MED Delivery:215461933:::0}    Wound Care Documentation and Therapy:        Elimination:  Continence: Bowel: {YES / L}  Bladder: {YES / KX:20220}  Urinary Catheter: {Urinary Catheter:082035882:::0}   Colostomy/Ileostomy/Ileal Conduit: {YES / RA:82449}       Date of Last BM: ***    Intake/Output Summary (Last 24 hours) at 2021 1347  Last data filed at 2021 0649  Gross per 24 hour   Intake 500 ml   Output 850 ml   Net -350 ml     I/O last 3 completed shifts:   In: 500 [I.V.:500]  Out: 850 [Urine:850]    Safety Concerns:     [employer] Safety Concerns:700174126:::0}    Impairments/Disabilities:      [employer] Impairments/Disabilities:973773944:::0}    Nutrition Therapy:  Current Nutrition Therapy:   [employer] Diet List:619257869:::0}    Routes of Feeding: {CHP DME Other Feedings:482315599:::0}  Liquids: {Slp liquid thickness:26076}  Daily Fluid Restriction: {CHP DME Yes amt example:024207611:::0}  Last Modified Barium Swallow with Video (Video Swallowing Test): {Done Not Done KVQY:121365225:::8}    Treatments at the Time of Hospital Discharge:   Respiratory Treatments: ***  Oxygen Therapy:  {Therapy; copd oxygen:88101:::0}  Ventilator:    {[facility] Vent List:019218146:::0}    Rehab Therapies: {THERAPEUTIC INTERVENTION:2651766094}  Weight Bearing Status/Restrictions: {[facility] Weight Bearin:::0}  Other Medical Equipment (for information only, NOT a DME order):  {EQUIPMENT:713197206}  Other Treatments: ***    Patient's personal belongings (please select all that are sent with patient):  {CHP DME Belongings:463764422:::0}    RN SIGNATURE:  {Esignature:750801450:::0}    CASE MANAGEMENT/SOCIAL WORK SECTION    Inpatient Status Date: 09/15/21    Readmission Risk Assessment Score:  Readmission Risk              Risk of Unplanned Readmission:  15           Discharging to Facility/ Agency   Name:   Address:  Phone:  Fax:    Dialysis Facility (if applicable)   Name:  Address:  Dialysis Schedule:  Phone:  Fax:    / signature: Electronically signed by JENNIFER Antunez on 9/20/21 at 10:13 AM EDT    PHYSICIAN SECTION    Prognosis: Good    Condition at Discharge: Stable    Rehab Potential (if transferring to Rehab): Good    Recommended Labs or Other Treatments After Discharge:   Partial weight bearing of 25% to operative extremity with use of walker for assistance   Mepliex dressing to right lateral hip can be removed on pod7 9/23/21  Follow up outpatient orthopedic clinic in two weeks  Lovenox daily for 21 days last dose to be on 28/1/26    Physician Certification: I certify the above information and transfer of Agnes Sanders  is necessary for the continuing treatment of the diagnosis listed and that he requires Acute Rehab for less 30 days.      Update Admission H&P: No change in H&P    PHYSICIAN SIGNATURE: Electronically signed by Candace Hammond MD on 9/17/2021 at 4:28 PM

## 2021-09-17 NOTE — PROGRESS NOTES
Assessment completed, patient alert and oriented x 4, denies any pain at this time, lungs noted diminished in the bases, 87%-3L nasal cannula, encouraged coughing and deep breathing, o2 up to 92%, encouraged his I.S. to use frequently, right leg with edema in the thigh from surgery, aquacel dressing clean, dry and intact, good movement and sensation, moderate pedal pulses, brisk cap refill, zane hose in place

## 2021-09-18 LAB
ALBUMIN SERPL-MCNC: 3 G/DL (ref 3.5–4.6)
ANION GAP SERPL CALCULATED.3IONS-SCNC: 11 MEQ/L (ref 9–15)
BUN BLDV-MCNC: 12 MG/DL (ref 8–23)
CALCIUM SERPL-MCNC: 8.5 MG/DL (ref 8.5–9.9)
CHLORIDE BLD-SCNC: 103 MEQ/L (ref 95–107)
CO2: 27 MEQ/L (ref 20–31)
CREAT SERPL-MCNC: 0.84 MG/DL (ref 0.7–1.2)
GFR AFRICAN AMERICAN: >60
GFR NON-AFRICAN AMERICAN: >60
GLUCOSE BLD-MCNC: 103 MG/DL (ref 70–99)
HCT VFR BLD CALC: 31.1 % (ref 42–52)
HEMOGLOBIN: 10.7 G/DL (ref 14–18)
MAGNESIUM: 2.2 MG/DL (ref 1.7–2.4)
MCH RBC QN AUTO: 31.1 PG (ref 27–31.3)
MCHC RBC AUTO-ENTMCNC: 34.5 % (ref 33–37)
MCV RBC AUTO: 90.3 FL (ref 80–100)
PDW BLD-RTO: 12.6 % (ref 11.5–14.5)
PHOSPHORUS: 2.4 MG/DL (ref 2.3–4.8)
PLATELET # BLD: 212 K/UL (ref 130–400)
POTASSIUM SERPL-SCNC: 3.8 MEQ/L (ref 3.4–4.9)
PRO-BNP: 166 PG/ML
PROCALCITONIN: 0.19 NG/ML (ref 0–0.15)
RBC # BLD: 3.44 M/UL (ref 4.7–6.1)
SODIUM BLD-SCNC: 141 MEQ/L (ref 135–144)
WBC # BLD: 10.5 K/UL (ref 4.8–10.8)

## 2021-09-18 PROCEDURE — 6370000000 HC RX 637 (ALT 250 FOR IP): Performed by: ORTHOPAEDIC SURGERY

## 2021-09-18 PROCEDURE — 2580000003 HC RX 258: Performed by: INTERNAL MEDICINE

## 2021-09-18 PROCEDURE — 83880 ASSAY OF NATRIURETIC PEPTIDE: CPT

## 2021-09-18 PROCEDURE — 2580000003 HC RX 258: Performed by: ORTHOPAEDIC SURGERY

## 2021-09-18 PROCEDURE — 6360000002 HC RX W HCPCS: Performed by: INTERNAL MEDICINE

## 2021-09-18 PROCEDURE — 97116 GAIT TRAINING THERAPY: CPT

## 2021-09-18 PROCEDURE — 6360000002 HC RX W HCPCS: Performed by: ORTHOPAEDIC SURGERY

## 2021-09-18 PROCEDURE — 83735 ASSAY OF MAGNESIUM: CPT

## 2021-09-18 PROCEDURE — 2700000000 HC OXYGEN THERAPY PER DAY

## 2021-09-18 PROCEDURE — 36415 COLL VENOUS BLD VENIPUNCTURE: CPT

## 2021-09-18 PROCEDURE — 6370000000 HC RX 637 (ALT 250 FOR IP): Performed by: INTERNAL MEDICINE

## 2021-09-18 PROCEDURE — 84145 PROCALCITONIN (PCT): CPT

## 2021-09-18 PROCEDURE — 85027 COMPLETE CBC AUTOMATED: CPT

## 2021-09-18 PROCEDURE — 99222 1ST HOSP IP/OBS MODERATE 55: CPT | Performed by: INTERNAL MEDICINE

## 2021-09-18 PROCEDURE — 1210000000 HC MED SURG R&B

## 2021-09-18 PROCEDURE — 97110 THERAPEUTIC EXERCISES: CPT

## 2021-09-18 PROCEDURE — 80069 RENAL FUNCTION PANEL: CPT

## 2021-09-18 RX ORDER — FUROSEMIDE 10 MG/ML
20 INJECTION INTRAMUSCULAR; INTRAVENOUS ONCE
Status: COMPLETED | OUTPATIENT
Start: 2021-09-18 | End: 2021-09-18

## 2021-09-18 RX ORDER — L. ACIDOPHILUS/L.BULGARICUS 1MM CELL
4 TABLET ORAL 3 TIMES DAILY
Status: DISCONTINUED | OUTPATIENT
Start: 2021-09-18 | End: 2021-09-21 | Stop reason: HOSPADM

## 2021-09-18 RX ADMIN — ACETAMINOPHEN 650 MG: 325 TABLET ORAL at 01:05

## 2021-09-18 RX ADMIN — SODIUM CHLORIDE, PRESERVATIVE FREE 10 ML: 5 INJECTION INTRAVENOUS at 09:59

## 2021-09-18 RX ADMIN — ACETAMINOPHEN 650 MG: 325 TABLET ORAL at 21:36

## 2021-09-18 RX ADMIN — PIPERACILLIN AND TAZOBACTAM 3375 MG: 3; .375 INJECTION, POWDER, LYOPHILIZED, FOR SOLUTION INTRAVENOUS at 01:07

## 2021-09-18 RX ADMIN — ENOXAPARIN SODIUM 40 MG: 40 INJECTION SUBCUTANEOUS at 09:56

## 2021-09-18 RX ADMIN — LACTOBACILLUS TAB 4 TABLET: TAB at 21:36

## 2021-09-18 RX ADMIN — PIPERACILLIN AND TAZOBACTAM 3375 MG: 3; .375 INJECTION, POWDER, LYOPHILIZED, FOR SOLUTION INTRAVENOUS at 19:05

## 2021-09-18 RX ADMIN — ABIRATERONE ACETATE 1000 MG: 250 TABLET ORAL at 07:38

## 2021-09-18 RX ADMIN — FUROSEMIDE 20 MG: 10 INJECTION, SOLUTION INTRAMUSCULAR; INTRAVENOUS at 14:38

## 2021-09-18 RX ADMIN — ACETAMINOPHEN 650 MG: 325 TABLET ORAL at 06:36

## 2021-09-18 RX ADMIN — ACETAMINOPHEN 650 MG: 325 TABLET ORAL at 14:37

## 2021-09-18 RX ADMIN — PIPERACILLIN AND TAZOBACTAM 3375 MG: 3; .375 INJECTION, POWDER, LYOPHILIZED, FOR SOLUTION INTRAVENOUS at 09:55

## 2021-09-18 RX ADMIN — PREDNISONE 5 MG: 1 TABLET ORAL at 09:57

## 2021-09-18 ASSESSMENT — PAIN SCALES - GENERAL
PAINLEVEL_OUTOF10: 0
PAINLEVEL_OUTOF10: 1
PAINLEVEL_OUTOF10: 1
PAINLEVEL_OUTOF10: 5
PAINLEVEL_OUTOF10: 0
PAINLEVEL_OUTOF10: 3

## 2021-09-18 ASSESSMENT — PAIN DESCRIPTION - ORIENTATION
ORIENTATION: RIGHT
ORIENTATION: RIGHT

## 2021-09-18 ASSESSMENT — PAIN DESCRIPTION - LOCATION
LOCATION: HIP
LOCATION: HIP

## 2021-09-18 ASSESSMENT — PAIN DESCRIPTION - PAIN TYPE
TYPE: SURGICAL PAIN
TYPE: SURGICAL PAIN

## 2021-09-18 NOTE — CARE COORDINATION
ARINW met with the pt and his daughter today. Pt is doing much better today but still needs rehab before he can be independent. [facility] is still first choice. If SNF is needed then they will look in the [place] area. Updated PT note was sent to Deborah Wilson at [facility] today.
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
## 2021-09-18 NOTE — CONSULTS
Pulmonary Medicine  Consult Note      Reason for consultation: Pneumonia and acute hypoxic respiratory failure. Consulting physician: Claire Curtis    HISTORY OF PRESENT ILLNESS:      This is 64-year-old male who has fall and fracture of right hip underwent for surgery. Patient was hypoxic 87% on 3 L. Required to increase oxygen 4 to 5 L to keep saturation above 90%. He has no fever or chills. No chest pain or pleuritic pain. He has no shortness of breath. He had CT chest which shows no pulmonary embolism. Bibasilar consolidation and patchy infiltration right base suggest possibility of typical and atypical pneumonia and consider aspiration. He denies any episode of vomiting. He is started on IV Zosyn. He was also given Lasix since there was small bilateral pleural effusion. Patient had leukocytosis but today WBC is within normal range. Pulmonary consulted for possible pneumonia and hypoxia. Chrissy Hemphill He is currently working with incentive spirometer and reaching about 2000 to 2500 cc. His O2 saturation is 97% on 3 L. Past Medical History:        Diagnosis Date    Cancer [facility])     prostate stage 4       Past Surgical History:        Procedure Laterality Date    HIP SURGERY Right 9/16/2021    FEMORAL NECK PLATING RIGHT HIP SYNTHES ROOM 267 performed by Hailey Mcclelland MD at [place] [address] History:     reports that he has never smoked. He has never used smokeless tobacco.    Family History:   History reviewed. No pertinent family history. Allergies:  Patient has no known allergies.         MEDICATIONS during current hospitalization:    Continuous Infusions:   sodium chloride      sodium chloride         Scheduled Meds:   piperacillin-tazobactam  3,375 mg IntraVENous Q8H    sodium chloride flush  10 mL IntraVENous 2 times per day    acetaminophen  650 mg Oral Q6H    sennosides-docusate sodium  1 tablet Oral BID    enoxaparin  40 mg SubCUTAneous Daily    abiraterone acetate  1,000 mg Oral Daily    predniSONE  5 mg Oral Daily    sodium chloride flush  5-40 mL IntraVENous 2 times per day       PRN Meds:oxyCODONE **OR** oxyCODONE, tiZANidine, sodium chloride flush, sodium chloride, magnesium hydroxide, famotidine (PEPCID) injection, sodium chloride flush, sodium chloride, ondansetron **OR** ondansetron, polyethylene glycol, morphine    REVIEW OF SYSTEMS:  As in history of present illness  Other 14 point review of system is negative. PHYSICAL EXAM:    Vitals:  BP (!) 161/57   Pulse 84   Temp 98.2 °F (36.8 °C) (Oral)   Resp 20   Ht 5' 11\" (1.803 m)   Wt 276 lb 3.8 oz (125.3 kg)   SpO2 93%   BMI 38.53 kg/m²   General: Obese, alert, awake, Oriented x3  .comfortable in bed, No distress. Head: Atraumatic , Normocephalic   Eyes: PERRL. No sclera icterus. No conjunctival injection. No discharge   ENT: No nasal  discharge. Pharynx clear. Neck:  Trachea midline. No thyromegaly, no JVD, No cervical adenopathy. Chest : Bilaterally symmetrical ,Normal effort,  No accessory muscle use  Lung : Diminished BS bilateraly. Bibasilar Rales. No wheezing. No rhonchi. Heart[de-identified] Normal  rate. Regular rhythm. No mumur ,  Rub or gallop  ABD: Non-tender. Non-distended. No masses. No organmegaly. Normal bowel sounds. No hernia. Extremities: No pitting in both lower leg , No Cyanosis ,No clubbing  Neuro: no cranial nerve abnormality, normal reflex and sensation, no focal weakness   Skin: Warm and dry. No erythema rash on exposed extremities. Data Review  Recent Labs     09/15/21  1915 09/17/21  0918 09/18/21  0523   WBC 15.0* 12.8* 10.5   HGB 14.1 12.2* 10.7*   HCT 42.3 35.7* 31.1*    229 212      Recent Labs     09/15/21  1915 09/17/21  0918 09/18/21  0522    129* 141   K 4.2 3.3* 3.8    96 103   CO2 25 21 27   BUN 15 13 12   CREATININE 0.91 0.83 0.84   GLUCOSE 112* 154* 103*       MV Settings:           ABGs: No results for input(s): PHART, IAV6YJN, PO2ART, DMN2WEL, BEART, H8CKGBJE, No acute findings within the thorax. XR FEMUR RIGHT (MIN 2 VIEWS)    Result Date: 9/15/2021  EXAMINATION/TECHNIQUE:  XR HIP RIGHT (2-3 VIEWS), XR FEMUR RIGHT (MIN 2 VIEWS), XR CHEST (SINGLE VIEW FRONTAL) HISTORY:  Fall with hip pain. COMPARISON: None RESULT: Bony pelvis/right hip/right femur: Acute right femoral neck fracture involving mostly the subcapital region, with associated impaction, with minimal displacement. Remainder of the bony pelvis appears grossly intact without other fracture. No distinct acute other fracture involving the right femur. Alignment at the hip joint appears maintained. Degenerative changes lumbar spine. SI joints and pubic symphysis maintained. Chest x-ray:No consolidation. No pleural effusion. No pneumothorax. Normal pulmonary vascular pattern. Normal cardiomediastinal silhouette. No acute osseous findings. No other significant abnormality. Acute right femoral neck fracture. No acute findings within the thorax. CTA CHEST W WO CONTRAST    Result Date: 9/17/2021  The EXAMINATION: CT scan of the chest with contrast (pulmonary embolism protocol) INDICATION: Short of breath. Recent ORIF right femur COMPARISON: None TECHNIQUE: Helical CT was performed through the chest utilizing 100 cc of Isovue-300 intravenous contrast.  Images were obtained with bolus tracking in order to opacify the pulmonary arteries. Both MIP and 3D volume rendered reconstructions were performed. FINDINGS: There are no findings a central, proximal or proximal-segmental pulmonary emboli. There are diffuse areas of infiltrates consolidations throughout the right lower lobe and left lower lobe. There are small bilateral pleural effusions. Right greater than left. No pneumothoraces. No significant periaortic, pretracheal perihilar or subcarinal adenopathy. Field-of-view there is a small hiatal hernia. Osseous structures are intact.       1.IMPRESSION: 2.NO FINDINGS A CENTRAL, PROXIMAL PROXIMAL-SEGMENTAL PORT EMBOLI. 3.BIBASILAR AREAS OF PATCHY TO COALESCENT INFILTRATES CONSOLIDATION WITHIN THE RIGHT LOWER LOBE AND LEFT LOWER LOBE WITH SMALL BILATERAL PLEURAL EFFUSIONS RIGHT GREATER THAN LEFT. CONSIDER BOTH TYPICAL AND ATYPICAL INFECTIOUS ETIOLOGY AS WELL AS POSSIBLE  ASPIRATION ETIOLOGY. Eva Jack All CT scans at this facility use dose modulation, iterative reconstruction, and/or weight based dosing when appropriate to reduce radiation dose to as low as reasonably achievable. FLUORO FOR SURGICAL PROCEDURES    Result Date: 9/16/2021  EXAMINATION: FLUORO FOR SURGICAL PROCEDURES CLINICAL HISTORY:  pain COMPARISONS: None available. FINDINGS: Fluoroscopic assistance was provided during ORIF of the right femur The total radiation time is 47.7 seconds, radiation dose is 8.94 mGy. Intraprocedural fluoroscopic support has been provided. Please refer to the procedure report. Assessment and  plan:     1. Acute right femoral neck fracture status post surgery  2. Bibasilar consolidation with patchy infiltration right lower lobe rule out aspiration  3. Small bilateral pleural effusion follow-up fluid overload  4. Obesity with  hypoventilation and bibasilar atelectasis. 5. Acute respiratory failure secondary to above, improving  6. Metastatic prostate cancer    He  had leukocytosis which improved. CT chest shows no PE bibasilar consolidation and patchy infiltration more on right side could be due to aspiration but other possibility is hypoventilation related bibasilar atelectasis due to receiving pain medication postop and also fluid overload considering bilateral pleural effusion. IV Lasix given. Procalcitonin is not significantly elevated. Leukocytosis could be stress related but still pneumonia need to consider due to development of acute hypoxic respiratory failure requiring 4 to 5 L O2. Patient is doing incentive spirometer which is more than 2000 cc. He is not coughing any mucus out. He has no fever.   He started on IV Zosyn which can be changed to p.o. Augmentin prior to discharge to skilled nursing facility for rehab. Chest x-ray follow-up tomorrow. Continue present treatment plan. NOTE: This report was transcribed using voice recognition software. Every effort was made to ensure accuracy; however, inadvertent computerized transcription errors may be present.     Electronically signed by Karen Ibarra MD, [facility] on 9/18/2021 at 5:14 PM
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
## 2021-09-19 ENCOUNTER — APPOINTMENT (OUTPATIENT)
Dept: GENERAL RADIOLOGY | Age: 61
DRG: 480 | End: 2021-09-19
Payer: COMMERCIAL

## 2021-09-19 LAB
ALBUMIN SERPL-MCNC: 3.2 G/DL (ref 3.5–4.6)
ANION GAP SERPL CALCULATED.3IONS-SCNC: 10 MEQ/L (ref 9–15)
BUN BLDV-MCNC: 15 MG/DL (ref 8–23)
CALCIUM SERPL-MCNC: 8.5 MG/DL (ref 8.5–9.9)
CHLORIDE BLD-SCNC: 102 MEQ/L (ref 95–107)
CO2: 26 MEQ/L (ref 20–31)
CREAT SERPL-MCNC: 0.64 MG/DL (ref 0.7–1.2)
GFR AFRICAN AMERICAN: >60
GFR NON-AFRICAN AMERICAN: >60
GLUCOSE BLD-MCNC: 98 MG/DL (ref 70–99)
HCT VFR BLD CALC: 31.6 % (ref 42–52)
HEMOGLOBIN: 11 G/DL (ref 14–18)
MAGNESIUM: 2.1 MG/DL (ref 1.7–2.4)
MCH RBC QN AUTO: 31.5 PG (ref 27–31.3)
MCHC RBC AUTO-ENTMCNC: 34.9 % (ref 33–37)
MCV RBC AUTO: 90.4 FL (ref 80–100)
PDW BLD-RTO: 12.4 % (ref 11.5–14.5)
PHOSPHORUS: 2.8 MG/DL (ref 2.3–4.8)
PLATELET # BLD: 264 K/UL (ref 130–400)
POTASSIUM SERPL-SCNC: 2.8 MEQ/L (ref 3.4–4.9)
RBC # BLD: 3.49 M/UL (ref 4.7–6.1)
SODIUM BLD-SCNC: 138 MEQ/L (ref 135–144)
WBC # BLD: 9.9 K/UL (ref 4.8–10.8)

## 2021-09-19 PROCEDURE — 36415 COLL VENOUS BLD VENIPUNCTURE: CPT

## 2021-09-19 PROCEDURE — 2580000003 HC RX 258: Performed by: INTERNAL MEDICINE

## 2021-09-19 PROCEDURE — 1210000000 HC MED SURG R&B

## 2021-09-19 PROCEDURE — 85027 COMPLETE CBC AUTOMATED: CPT

## 2021-09-19 PROCEDURE — 6360000002 HC RX W HCPCS: Performed by: INTERNAL MEDICINE

## 2021-09-19 PROCEDURE — 6360000002 HC RX W HCPCS: Performed by: ORTHOPAEDIC SURGERY

## 2021-09-19 PROCEDURE — 99233 SBSQ HOSP IP/OBS HIGH 50: CPT | Performed by: INTERNAL MEDICINE

## 2021-09-19 PROCEDURE — 6370000000 HC RX 637 (ALT 250 FOR IP): Performed by: ORTHOPAEDIC SURGERY

## 2021-09-19 PROCEDURE — 80069 RENAL FUNCTION PANEL: CPT

## 2021-09-19 PROCEDURE — 71045 X-RAY EXAM CHEST 1 VIEW: CPT

## 2021-09-19 PROCEDURE — 97116 GAIT TRAINING THERAPY: CPT

## 2021-09-19 PROCEDURE — 2580000003 HC RX 258: Performed by: ORTHOPAEDIC SURGERY

## 2021-09-19 PROCEDURE — 6370000000 HC RX 637 (ALT 250 FOR IP): Performed by: INTERNAL MEDICINE

## 2021-09-19 PROCEDURE — 83735 ASSAY OF MAGNESIUM: CPT

## 2021-09-19 PROCEDURE — 2700000000 HC OXYGEN THERAPY PER DAY

## 2021-09-19 RX ORDER — POTASSIUM CHLORIDE 20 MEQ/1
40 TABLET, EXTENDED RELEASE ORAL EVERY 4 HOURS
Status: COMPLETED | OUTPATIENT
Start: 2021-09-19 | End: 2021-09-19

## 2021-09-19 RX ORDER — AMOXICILLIN AND CLAVULANATE POTASSIUM 875; 125 MG/1; MG/1
1 TABLET, FILM COATED ORAL EVERY 12 HOURS SCHEDULED
Status: DISCONTINUED | OUTPATIENT
Start: 2021-09-19 | End: 2021-09-21 | Stop reason: HOSPADM

## 2021-09-19 RX ADMIN — ACETAMINOPHEN 650 MG: 325 TABLET ORAL at 12:37

## 2021-09-19 RX ADMIN — LACTOBACILLUS TAB 4 TABLET: TAB at 21:59

## 2021-09-19 RX ADMIN — PREDNISONE 5 MG: 1 TABLET ORAL at 09:32

## 2021-09-19 RX ADMIN — ABIRATERONE ACETATE 1000 MG: 250 TABLET ORAL at 06:39

## 2021-09-19 RX ADMIN — POTASSIUM CHLORIDE 40 MEQ: 20 TABLET, EXTENDED RELEASE ORAL at 19:41

## 2021-09-19 RX ADMIN — POTASSIUM CHLORIDE 40 MEQ: 20 TABLET, EXTENDED RELEASE ORAL at 17:06

## 2021-09-19 RX ADMIN — POTASSIUM CHLORIDE 40 MEQ: 20 TABLET, EXTENDED RELEASE ORAL at 12:38

## 2021-09-19 RX ADMIN — ACETAMINOPHEN 650 MG: 325 TABLET ORAL at 04:55

## 2021-09-19 RX ADMIN — LACTOBACILLUS TAB 4 TABLET: TAB at 09:29

## 2021-09-19 RX ADMIN — ACETAMINOPHEN 650 MG: 325 TABLET ORAL at 21:58

## 2021-09-19 RX ADMIN — ENOXAPARIN SODIUM 40 MG: 40 INJECTION SUBCUTANEOUS at 09:28

## 2021-09-19 RX ADMIN — ACETAMINOPHEN 650 MG: 325 TABLET ORAL at 19:40

## 2021-09-19 RX ADMIN — LACTOBACILLUS TAB 4 TABLET: TAB at 17:12

## 2021-09-19 RX ADMIN — AMOXICILLIN AND CLAVULANATE POTASSIUM 1 TABLET: 875; 125 TABLET, FILM COATED ORAL at 21:58

## 2021-09-19 RX ADMIN — SODIUM CHLORIDE, PRESERVATIVE FREE 10 ML: 5 INJECTION INTRAVENOUS at 22:01

## 2021-09-19 RX ADMIN — PIPERACILLIN AND TAZOBACTAM 3375 MG: 3; .375 INJECTION, POWDER, LYOPHILIZED, FOR SOLUTION INTRAVENOUS at 04:55

## 2021-09-19 RX ADMIN — SODIUM CHLORIDE, PRESERVATIVE FREE 10 ML: 5 INJECTION INTRAVENOUS at 09:33

## 2021-09-19 ASSESSMENT — PAIN SCALES - GENERAL
PAINLEVEL_OUTOF10: 0
PAINLEVEL_OUTOF10: 0
PAINLEVEL_OUTOF10: 1
PAINLEVEL_OUTOF10: 0
PAINLEVEL_OUTOF10: 0

## 2021-09-19 NOTE — PROGRESS NOTES
INPATIENT PROGRESS NOTES    PATIENT NAME: Ni Mc  MRN: [id number]  SERVICE DATE:  September 19, 2021   SERVICE TIME:  6:36 PM      PRIMARY SERVICE: Pulmonary Disease    CHIEF COMPLAIN: Pneumonia and atelectasis with hypoxia      INTERVAL HPI: Patient seen and examined at bedside, Interval Notes, orders reviewed. Nursing notes noted  Patient is feeling much better. He is on room air. O2 saturation 92%. He has no cough or sputum production. No fever or chills. He had chest x-ray done today shows no acute intrathoracic process. He still has some short of breath with exertion. He is on IV Zosyn. He is afebrile. Discussed with wife at bedside. OBJECTIVE    Body mass index is 38.53 kg/m². PHYSICAL EXAM:  Vitals:  BP (!) 148/65   Pulse 79   Temp 98.8 °F (37.1 °C) (Oral)   Resp 18   Ht 5' 11\" (1.803 m)   Wt 276 lb 3.8 oz (125.3 kg)   SpO2 92%   BMI 38.53 kg/m²   General:Alert, awake . comfortable in bed, No distress. Head: Atraumatic , Normocephalic   Eyes: PERRL. No sclera icterus. No conjunctival injection. No discharge   ENT: No nasal  discharge. Pharynx clear. Neck:  Trachea midline. No thyromegaly, no JVD, No cervical adenopathy. Chest : Bilaterally symmetrical ,Normal effort,  No accessory muscle use  Lung : . Fair BS bilateral, decreased BS at bases. No Rales. No wheezing. No rhonchi. Heart[de-identified] Normal  rate. Regular rhythm. No mumur ,  Rub or gallop  ABD: Non-tender. Non-distended. No masses. No organmegaly. Normal bowel sounds. No hernia.   Ext : No Pitting both leg , No Cyanosis No clubbing  Neuro: no focal weakness          DATA:   Recent Labs     09/18/21  0523 09/19/21  0540   WBC 10.5 9.9   HGB 10.7* 11.0*   HCT 31.1* 31.6*   MCV 90.3 90.4    264     Recent Labs     09/18/21  0522 09/18/21  0522 09/19/21  0540     --  138   K 3.8  --  2.8*     --  102   CO2 27  --  26   BUN 12  --  15   CREATININE 0.84  --  0.64*   GLUCOSE 103*  --  98   CALCIUM 8.5  --  8.5 LABALBU 3.0*  --  3.2*   LABGLOM >60.0   < > >60.0   GFRAA >60.0   < > >60.0    < > = values in this interval not displayed. MV Settings:          No results for input(s): PHART, XYZ0USS, PO2ART, HVF3VNB, BEART, [id number] in the last 72 hours. O2 Device: None (Room air)  O2 Flow Rate (L/min): 5 L/min    ADULT DIET; Regular     MEDICATIONS during current hospitalization:    Continuous Infusions:   sodium chloride      sodium chloride         Scheduled Meds:   potassium chloride  40 mEq Oral Q4H    amoxicillin-clavulanate  1 tablet Oral 2 times per day    lactobacillus acidophilus  4 tablet Oral TID    sodium chloride flush  10 mL IntraVENous 2 times per day    acetaminophen  650 mg Oral Q6H    sennosides-docusate sodium  1 tablet Oral BID    enoxaparin  40 mg SubCUTAneous Daily    abiraterone acetate  1,000 mg Oral Daily    predniSONE  5 mg Oral Daily    sodium chloride flush  5-40 mL IntraVENous 2 times per day       PRN Meds:oxyCODONE **OR** oxyCODONE, tiZANidine, sodium chloride flush, sodium chloride, magnesium hydroxide, famotidine (PEPCID) injection, sodium chloride flush, sodium chloride, ondansetron **OR** ondansetron, polyethylene glycol, morphine    Radiology  XR CHEST (SINGLE VIEW FRONTAL)    Result Date: 9/15/2021  EXAMINATION/TECHNIQUE:  XR HIP RIGHT (2-3 VIEWS), XR FEMUR RIGHT (MIN 2 VIEWS), XR CHEST (SINGLE VIEW FRONTAL) HISTORY:  Fall with hip pain. COMPARISON: None RESULT: Bony pelvis/right hip/right femur: Acute right femoral neck fracture involving mostly the subcapital region, with associated impaction, with minimal displacement. Remainder of the bony pelvis appears grossly intact without other fracture. No distinct acute other fracture involving the right femur. Alignment at the hip joint appears maintained. Degenerative changes lumbar spine. SI joints and pubic symphysis maintained. Chest x-ray:No consolidation. No pleural effusion. No pneumothorax.  Normal pulmonary vascular pattern. Normal cardiomediastinal silhouette. No acute osseous findings. No other significant abnormality. Acute right femoral neck fracture. No acute findings within the thorax. XR HIP RIGHT (2-3 VIEWS)    Result Date: 9/15/2021  EXAMINATION/TECHNIQUE:  XR HIP RIGHT (2-3 VIEWS), XR FEMUR RIGHT (MIN 2 VIEWS), XR CHEST (SINGLE VIEW FRONTAL) HISTORY:  Fall with hip pain. COMPARISON: None RESULT: Bony pelvis/right hip/right femur: Acute right femoral neck fracture involving mostly the subcapital region, with associated impaction, with minimal displacement. Remainder of the bony pelvis appears grossly intact without other fracture. No distinct acute other fracture involving the right femur. Alignment at the hip joint appears maintained. Degenerative changes lumbar spine. SI joints and pubic symphysis maintained. Chest x-ray:No consolidation. No pleural effusion. No pneumothorax. Normal pulmonary vascular pattern. Normal cardiomediastinal silhouette. No acute osseous findings. No other significant abnormality. Acute right femoral neck fracture. No acute findings within the thorax. XR FEMUR RIGHT (MIN 2 VIEWS)    Result Date: 9/15/2021  EXAMINATION/TECHNIQUE:  XR HIP RIGHT (2-3 VIEWS), XR FEMUR RIGHT (MIN 2 VIEWS), XR CHEST (SINGLE VIEW FRONTAL) HISTORY:  Fall with hip pain. COMPARISON: None RESULT: Bony pelvis/right hip/right femur: Acute right femoral neck fracture involving mostly the subcapital region, with associated impaction, with minimal displacement. Remainder of the bony pelvis appears grossly intact without other fracture. No distinct acute other fracture involving the right femur. Alignment at the hip joint appears maintained. Degenerative changes lumbar spine. SI joints and pubic symphysis maintained. Chest x-ray:No consolidation. No pleural effusion. No pneumothorax. Normal pulmonary vascular pattern. Normal cardiomediastinal silhouette. No acute osseous findings.  No other significant abnormality. Acute right femoral neck fracture. No acute findings within the thorax. CTA CHEST W WO CONTRAST    Result Date: 9/17/2021  The EXAMINATION: CT scan of the chest with contrast (pulmonary embolism protocol) INDICATION: Short of breath. Recent ORIF right femur COMPARISON: None TECHNIQUE: Helical CT was performed through the chest utilizing 100 cc of Isovue-300 intravenous contrast.  Images were obtained with bolus tracking in order to opacify the pulmonary arteries. Both MIP and 3D volume rendered reconstructions were performed. FINDINGS: There are no findings a central, proximal or proximal-segmental pulmonary emboli. There are diffuse areas of infiltrates consolidations throughout the right lower lobe and left lower lobe. There are small bilateral pleural effusions. Right greater than left. No pneumothoraces. No significant periaortic, pretracheal perihilar or subcarinal adenopathy. Field-of-view there is a small hiatal hernia. Osseous structures are intact. 1.IMPRESSION: 2.NO FINDINGS A CENTRAL, PROXIMAL PROXIMAL-SEGMENTAL PORT EMBOLI. 3.BIBASILAR AREAS OF PATCHY TO COALESCENT INFILTRATES CONSOLIDATION WITHIN THE RIGHT LOWER LOBE AND LEFT LOWER LOBE WITH SMALL BILATERAL PLEURAL EFFUSIONS RIGHT GREATER THAN LEFT. CONSIDER BOTH TYPICAL AND ATYPICAL INFECTIOUS ETIOLOGY AS WELL AS POSSIBLE  ASPIRATION ETIOLOGY. Catalina Ramsye All CT scans at this facility use dose modulation, iterative reconstruction, and/or weight based dosing when appropriate to reduce radiation dose to as low as reasonably achievable. XR CHEST PORTABLE    Result Date: 9/19/2021  Exam: XR CHEST PORTABLE History: Bibasilar pneumonia and atelectasis. Technique: AP portable view of the chest obtained. Comparison: CT chest September 17, 2021 and chest radiographs September 15, 2020 oh Findings: The cardiomediastinal silhouette is within normal limits.   No pneumothorax, pleural effusion, or consolidation identified by radiography. The previously seen lung abnormalities on recent CT are not well visualized on this examination. No acute osseous abnormality. No radiographic evidence of acute intrathoracic process. Previously seen atelectasis/infiltrate on CT is not well visualized by radiography. FLUORO FOR SURGICAL PROCEDURES    Result Date: 9/16/2021  EXAMINATION: FLUORO FOR SURGICAL PROCEDURES CLINICAL HISTORY:  pain COMPARISONS: None available. FINDINGS: Fluoroscopic assistance was provided during ORIF of the right femur The total radiation time is 47.7 seconds, radiation dose is 8.94 mGy. Intraprocedural fluoroscopic support has been provided. Please refer to the procedure report. IMPRESSION AND SUGGESTION:  1. Acute right femoral neck fracture status post surgery  2. Bibasilar consolidation with patchy infiltration right lower lobe rule out aspiration  3. Small bilateral pleural effusion follow-up fluid overload  4. Obesity with  hypoventilation and bibasilar atelectasis. 5. Acute respiratory failure secondary to above, improving  6. Metastatic prostate cancer    Patient is feeling much better. Shortness of breath is almost baseline. Said he has no cough he has no fever. Chest x-ray done today showing no acute intrathoracic process. He is switched to p.o. Augmentin. Room air O2 saturation 92%. Discussed with girlfriend. Possible transfer to rehab tomorrow      NOTE: This report was transcribed using voice recognition software. Every effort was made to ensure accuracy; however, inadvertent computerized transcription errors may be present.       Electronically signed by Sonia Rod MD, [facility]P on 9/19/2021 at 6:36 PM
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
## 2021-09-19 NOTE — PROGRESS NOTES
Hospitalist Progress Note      PCP: No primary care provider on file. Date of Admission: 9/15/2021    Chief Complaint:  No acute events, afebrile, stable HD, SPO2 is 90-92% on RA, feels much better    Medications:  Reviewed    Infusion Medications    sodium chloride      sodium chloride       Scheduled Medications    potassium chloride  40 mEq Oral Q4H    lactobacillus acidophilus  4 tablet Oral TID    piperacillin-tazobactam  3,375 mg IntraVENous Q8H    sodium chloride flush  10 mL IntraVENous 2 times per day    acetaminophen  650 mg Oral Q6H    sennosides-docusate sodium  1 tablet Oral BID    enoxaparin  40 mg SubCUTAneous Daily    abiraterone acetate  1,000 mg Oral Daily    predniSONE  5 mg Oral Daily    sodium chloride flush  5-40 mL IntraVENous 2 times per day     PRN Meds: oxyCODONE **OR** oxyCODONE, tiZANidine, sodium chloride flush, sodium chloride, magnesium hydroxide, famotidine (PEPCID) injection, sodium chloride flush, sodium chloride, ondansetron **OR** ondansetron, polyethylene glycol, morphine    No intake or output data in the 24 hours ending 09/19/21 1220    Exam:    BP (!) 148/65   Pulse 79   Temp 98.8 °F (37.1 °C) (Oral)   Resp 18   Ht 5' 11\" (1.803 m)   Wt 276 lb 3.8 oz (125.3 kg)   SpO2 92%   BMI 38.53 kg/m²     General appearance: awake, cooperative. Respiratory:  Diminished bilaterally at the bases  Cardiovascular: Regular rate and rhythm, S1/S2. Abdomen: Soft, active bowel sounds. Musculoskeletal: No edema bilaterally.       Labs:   Recent Labs     09/17/21 0918 09/18/21  0523 09/19/21  0540   WBC 12.8* 10.5 9.9   HGB 12.2* 10.7* 11.0*   HCT 35.7* 31.1* 31.6*    212 264     Recent Labs     09/17/21  0918 09/18/21  0522 09/19/21  0540   * 141 138   K 3.3* 3.8 2.8*   CL 96 103 102   CO2 21 27 26   BUN 13 12 15   CREATININE 0.83 0.84 0.64*   CALCIUM 8.3* 8.5 8.5   PHOS  --  2.4 2.8     No results for input(s): AST, ALT, BILIDIR, BILITOT, ALKPHOS in the last via NC  - CTA of the chest was negative for PE, it showed bilateral  infiltrates / consolidations of RRL and LLL, bilateral pleural effusions  - treated with IV Zosyn, switched to PO Augmentin  - s/p IV lasix x 2  - improving, on RA today, CXR was negative  - pulmonology following    Hyponatremia   - improved    Hypokalemia   - replaced    Leukocytosis   - likely reactive to pain from hip fracture  - improved    Metastatic prostate cancer  - continue home regimen    Diet: ADULT DIET;  Regular    Code Status: Full Code      Disposition - acute rehab,  following          Electronically signed by Edis Gates MD on 9/19/2021 at 12:20 PM
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
## 2021-09-19 NOTE — PLAN OF CARE
Problem: Pain:  Goal: Pain level will decrease  Description: Pain level will decrease  Outcome: Ongoing  Goal: Control of acute pain  Description: Control of acute pain  Outcome: Ongoing  Goal: Control of chronic pain  Description: Control of chronic pain  Outcome: Ongoing     Problem: IP DRESSINGS LOWER EXTREMITIES  Goal: LTG - Patient will safely utilize adaptive techniques/equipment to dress lower body  Outcome: Ongoing     Problem: IP DRESSINGS LOWER EXTREMITIES  Goal: LTG - Patient will safely utilize adaptive techniques/equipment to dress lower body  Outcome: Ongoing     Problem: Falls - Risk of:  Goal: Will remain free from falls  Description: Will remain free from falls  Outcome: Ongoing  Goal: Absence of physical injury  Description: Absence of physical injury  Outcome: Ongoing     Problem: Sensory:  Goal: Ability to identify factors that increase the pain will improve  Description: Ability to identify factors that increase the pain will improve  Outcome: Ongoing  Goal: Ability to demonstrate ways to enhance comfort will improve  Description: Ability to demonstrate ways to enhance comfort will improve  Outcome: Ongoing  Goal: General experience of comfort will improve  Description: General experience of comfort will improve  Outcome: Ongoing     Problem: IP MOBILITY  Goal: LTG - patient will demonstrate safe mobility requirements  Outcome: Ongoing     Problem: IP DRESSINGS LOWER EXTREMITIES  Goal: LTG - Patient will safely utilize adaptive techniques/equipment to dress lower body  Outcome: Ongoing

## 2021-09-20 VITALS
SYSTOLIC BLOOD PRESSURE: 142 MMHG | OXYGEN SATURATION: 95 % | TEMPERATURE: 99.1 F | HEART RATE: 76 BPM | RESPIRATION RATE: 18 BRPM | BODY MASS INDEX: 38.67 KG/M2 | WEIGHT: 276.24 LBS | HEIGHT: 71 IN | DIASTOLIC BLOOD PRESSURE: 67 MMHG

## 2021-09-20 LAB
ALBUMIN SERPL-MCNC: 3.1 G/DL (ref 3.5–4.6)
ANION GAP SERPL CALCULATED.3IONS-SCNC: 12 MEQ/L (ref 9–15)
BUN BLDV-MCNC: 16 MG/DL (ref 8–23)
CALCIUM SERPL-MCNC: 8.5 MG/DL (ref 8.5–9.9)
CHLORIDE BLD-SCNC: 103 MEQ/L (ref 95–107)
CO2: 23 MEQ/L (ref 20–31)
CREAT SERPL-MCNC: 0.65 MG/DL (ref 0.7–1.2)
GFR AFRICAN AMERICAN: >60
GFR NON-AFRICAN AMERICAN: >60
GLUCOSE BLD-MCNC: 83 MG/DL (ref 70–99)
HCT VFR BLD CALC: 30.3 % (ref 42–52)
HEMOGLOBIN: 10.6 G/DL (ref 14–18)
MAGNESIUM: 2.3 MG/DL (ref 1.7–2.4)
MCH RBC QN AUTO: 31.1 PG (ref 27–31.3)
MCHC RBC AUTO-ENTMCNC: 34.8 % (ref 33–37)
MCV RBC AUTO: 89.5 FL (ref 80–100)
PDW BLD-RTO: 12.6 % (ref 11.5–14.5)
PHOSPHORUS: 3.1 MG/DL (ref 2.3–4.8)
PLATELET # BLD: 282 K/UL (ref 130–400)
POTASSIUM SERPL-SCNC: 3.1 MEQ/L (ref 3.4–4.9)
RBC # BLD: 3.39 M/UL (ref 4.7–6.1)
SODIUM BLD-SCNC: 138 MEQ/L (ref 135–144)
WBC # BLD: 8.4 K/UL (ref 4.8–10.8)

## 2021-09-20 PROCEDURE — 1210000000 HC MED SURG R&B

## 2021-09-20 PROCEDURE — 85027 COMPLETE CBC AUTOMATED: CPT

## 2021-09-20 PROCEDURE — 80069 RENAL FUNCTION PANEL: CPT

## 2021-09-20 PROCEDURE — 6370000000 HC RX 637 (ALT 250 FOR IP): Performed by: ORTHOPAEDIC SURGERY

## 2021-09-20 PROCEDURE — 36415 COLL VENOUS BLD VENIPUNCTURE: CPT

## 2021-09-20 PROCEDURE — 6360000002 HC RX W HCPCS: Performed by: ORTHOPAEDIC SURGERY

## 2021-09-20 PROCEDURE — 99232 SBSQ HOSP IP/OBS MODERATE 35: CPT | Performed by: INTERNAL MEDICINE

## 2021-09-20 PROCEDURE — 99232 SBSQ HOSP IP/OBS MODERATE 35: CPT | Performed by: PHYSICAL MEDICINE & REHABILITATION

## 2021-09-20 PROCEDURE — 6370000000 HC RX 637 (ALT 250 FOR IP): Performed by: INTERNAL MEDICINE

## 2021-09-20 PROCEDURE — 97535 SELF CARE MNGMENT TRAINING: CPT

## 2021-09-20 PROCEDURE — 97116 GAIT TRAINING THERAPY: CPT

## 2021-09-20 PROCEDURE — 83735 ASSAY OF MAGNESIUM: CPT

## 2021-09-20 RX ORDER — OXYCODONE HYDROCHLORIDE 5 MG/1
5 TABLET ORAL EVERY 6 HOURS PRN
Qty: 28 TABLET | Refills: 0 | Status: SHIPPED | OUTPATIENT
Start: 2021-09-20 | End: 2021-09-27

## 2021-09-20 RX ORDER — TIZANIDINE 4 MG/1
4 TABLET ORAL EVERY 6 HOURS PRN
Qty: 12 TABLET | Refills: 0 | Status: SHIPPED | OUTPATIENT
Start: 2021-09-20 | End: 2021-09-23

## 2021-09-20 RX ORDER — SENNA AND DOCUSATE SODIUM 50; 8.6 MG/1; MG/1
1 TABLET, FILM COATED ORAL 2 TIMES DAILY
Qty: 20 TABLET | Refills: 0 | Status: SHIPPED | OUTPATIENT
Start: 2021-09-20 | End: 2021-09-30

## 2021-09-20 RX ADMIN — AMOXICILLIN AND CLAVULANATE POTASSIUM 1 TABLET: 875; 125 TABLET, FILM COATED ORAL at 20:02

## 2021-09-20 RX ADMIN — ABIRATERONE ACETATE 1000 MG: 250 TABLET ORAL at 06:36

## 2021-09-20 RX ADMIN — AMOXICILLIN AND CLAVULANATE POTASSIUM 1 TABLET: 875; 125 TABLET, FILM COATED ORAL at 08:27

## 2021-09-20 RX ADMIN — ENOXAPARIN SODIUM 40 MG: 40 INJECTION SUBCUTANEOUS at 08:26

## 2021-09-20 RX ADMIN — PREDNISONE 5 MG: 1 TABLET ORAL at 08:29

## 2021-09-20 RX ADMIN — LACTOBACILLUS TAB 4 TABLET: TAB at 20:02

## 2021-09-20 RX ADMIN — ACETAMINOPHEN 650 MG: 325 TABLET ORAL at 06:36

## 2021-09-20 RX ADMIN — LACTOBACILLUS TAB 4 TABLET: TAB at 08:27

## 2021-09-20 ASSESSMENT — PAIN SCALES - GENERAL
PAINLEVEL_OUTOF10: 0
PAINLEVEL_OUTOF10: 0

## 2021-09-20 NOTE — PROGRESS NOTES
to Supine: Supervision  Scooting: Supervision  Comment: HOB flat, patient completes without bed rail    Transfers  Sit to Stand: Stand by assistance  Stand to sit: Stand by assistance  Comment: sit to stand x4- Requires increased time and effort. Completes with good safety awareness. Ambulation  Ambulation?: Yes  Ambulation 1  Surface: level tile  Device: Rolling Walker  Assistance: Stand by assistance;Contact guard assistance  Quality of Gait: Demonstrates good ability to safely maintain RLE NWBing, no LOB  Gait Deviations: Decreased step height;Decreased step length  Distance: 20 feet     Exercises  Comments: reviewed seated exercises previously given to patient. Completes 20 reps of ea with good knowledge. ASSESSMENT   Body structures, Functions, Activity limitations: Decreased functional mobility ; Decreased ROM; Decreased endurance;Decreased balance; Increased pain;Decreased posture;Decreased strength  Assessment: Patient demonstrates good safety/knowledge during functional mobility and seated DINO. Continues to have most difficulty standing up from chair.      Discharge Recommendations:  Continue to assess pending progress, Patient would benefit from continued therapy after discharge    Goals  Long term goals  Long term goal 1: Bed mobility with indep  Long term goal 2: Functional transfers with indep  Long term goal 3: Amb 50ft with 2ww, indep, maintaining 25% PWB to R LE  Long term goal 4: Pt will negotiate 5 steps with handrail and AD CGA to enter/exit home  Patient Goals   Patient goals : \"to be able to go to the bathroom and walk\"    PLAN    Times per week: 7  Times per day:  (1-2)  Safety Devices  Type of devices: Call light within reach, Nurse notified, Left in bed, Bed alarm in place     AMPA (6 CLICK) BASIC MOBILITY  AM-PAC Inpatient Mobility Raw Score : 17    Therapy Time   Individual   Time In 0923   Time Out 0946   Minutes 23     Timed Code Treatment Minutes: 23 Minutes   TR 10   Gt 8   DINO 5 [facility], PTA, 09/20/21 at 11:57 AM         Definitions for assistance levels  Independent = pt does not require any physical supervision or assistance from another person for activity completion. Device may be needed.   Stand by assistance = pt requires verbal cues or instructions from another person, close to but not touching, to perform the activity  Minimal assistance= pt performs 75% or more of the activity; assistance is required to complete the activity  Moderate assistance= pt performs 50% of the activity; assistance is required to complete the activity  Maximal assistance = pt performs 25% of the activity; assistance is required to complete the activity  Dependent = pt requires total physical assistance to accomplish the task

## 2021-09-20 NOTE — PROGRESS NOTES
Physical Therapy Missed Treatment   Facility/Department: [facility] MED SURG F396/S172-84    NAME: Glo Forde    : 1960 (59 y.o.)  MRN: [id number]    Account: [de-identified]  Gender: male    Chart reviewed, attempted PT at 11:27. Patient unavailable 2° to:    [] Hold per nsg request    [x] Pt declined reporting he would like to have a pain pill prior to tx d/t being up and moving around for PICC line this AM. Pt requesting PT come back this afternoon for tx. RN present in room for pt's decline. [x] Nsg notified   [] Other notified    [] Pt. . off floor for test/procedure. [] Pt. Unavailable       Will attempt PT treatment again at earliest convenience.       Electronically signed by Casandra Guevara PTA on 21 at 11:31 AM EDT

## 2021-09-20 NOTE — PROGRESS NOTES
problems. Rehabilitation:  Physical therapy: FIMS:  Bed Mobility: Scooting: Supervision    Transfers: Sit to Stand: Stand by assistance  Stand to sit: Stand by assistance  Bed to Chair: Contact guard assistance, Ambulation 1  Surface: level tile  Device: Rolling Walker  Other Apparatus: O2  Assistance: Stand by assistance, Contact guard assistance  Quality of Gait: Demonstrates good ability to safely maintain RLE NWBing, no LOB  Gait Deviations: Decreased step height, Decreased step length  Distance: 20 feet  Comments: SpO2 WNL throughout. distance limited by medical lines and room setup.,      FIMS:  ,  , Assessment: Patient demonstrates good safety/knowlege during functional mobility and seated DINO. Continues to have most difficulty standing up from chair. Occupational therapy: FIMS:   ,  , Assessment: Pt. is a 59year old man from home who presents to [facility] with the above deficits s/p fall with R femor fx s/p fixation. Pt. demonstrates decreased balance, endurance and strength. Pt. would benefit from continued OT to maximize independence and safety with ADL tasks.     Speech therapy: FIMS:      SPEECH THERAPY               Diet/Swallow:                           COGNITION  OT:    SP:           Lab/X-ray studies reviewed, analyzed and discussed with patient and staff:   Recent Results (from the past 24 hour(s))   CBC    Collection Time: 09/20/21  5:32 AM   Result Value Ref Range    WBC 8.4 4.8 - 10.8 K/uL    RBC 3.39 (L) 4.70 - 6.10 M/uL    Hemoglobin 10.6 (L) 14.0 - 18.0 g/dL    Hematocrit 30.3 (L) 42.0 - 52.0 %    MCV 89.5 80.0 - 100.0 fL    MCH 31.1 27.0 - 31.3 pg    MCHC 34.8 33.0 - 37.0 %    RDW 12.6 11.5 - 14.5 %    Platelets 158 088 - 225 K/uL   RENAL FUNCTION PANEL    Collection Time: 09/20/21  5:32 AM   Result Value Ref Range    Sodium 138 135 - 144 mEq/L    Potassium 3.1 (L) 3.4 - 4.9 mEq/L    Chloride 103 95 - 107 mEq/L    CO2 23 20 - 31 mEq/L    Anion Gap 12 9 - 15 mEq/L    Glucose 83 70 - 99 mg/dL    BUN 16 8 - 23 mg/dL    CREATININE 0.65 (L) 0.70 - 1.20 mg/dL    GFR Non-African American >60.0 >60    GFR  >60.0 >60    Calcium 8.5 8.5 - 9.9 mg/dL    Phosphorus 3.1 2.3 - 4.8 mg/dL    Albumin 3.1 (L) 3.5 - 4.6 g/dL   Magnesium    Collection Time: 09/20/21  5:32 AM   Result Value Ref Range    Magnesium 2.3 1.7 - 2.4 mg/dL       Previous extensive, complex labs, notes and diagnostics reviewed and analyzed. ALLERGIES:    Allergies as of 09/15/2021    (No Known Allergies)      (please also verify by checking [facility])     Complex Physical Medicine & Rehab Issues Assess & Plan:   1. Severe abnormality of gait and mobility and impaired self-care and ADL's secondary to acute right femur Fx . Functional and medical status reassessed regarding patients ability to participate in therapies and patient found to be able to participate in  acute intensive comprehensive inpatient rehabilitation program including PT/OT to improve balance, ambulation, ADLs, and to improve the P/AROM. It is my opinion that they will be able to tolerate 3 hours of therapy a day and benefit from it at an acute level. 2. Bowel constipation and Bladder dysfunction overactive bladder:  frequent toileting, ambulate to bathroom with assistance, check post void residuals. Check for C.difficile x1 if >2 loose stools in 24 hours, continue bowel & bladder program.  Monitor for UTI symptoms including lethargy and confusion  3. Severe right hip pain and generalized OA pain: reassess pain every shift and prior to and after each therapy session, give prn  Tylenol, modalities prn in therapy, consider Lidoderm, K-pad prn.   4. Skin breakdown risk:  continue pressure relief program.  Daily skin exams and reports from nursing. 5. Severe fatigue due to immobility and nutritional deficits: Add vitamin B12 vitamin D and CoQ10 titrate dosing and add protein supplementation with low carb content.   6. Complex discharge planning:   Is from the [facility] and wishes to go to the [facility] for his rehab because it is closer to home. Complex Active General Medical Issues that complicate care Assess & Plan:     1. Principal Problem:    Closed fracture of neck of right femur [facility])  Active Problems:    Fall at home, initial encounter    Metastatic adenocarcinoma to prostate [facility])    Essential hypertension    Gait abnormality  Resolved Problems:    * No resolved hospital problems.  Carlene Damon D.O., PM&R     Attending    [address]

## 2021-09-20 NOTE — PLAN OF CARE
Problem: Pain:  Goal: Pain level will decrease  Description: Pain level will decrease  Outcome: Ongoing  Goal: Control of acute pain  Description: Control of acute pain  Outcome: Ongoing  Goal: Control of chronic pain  Description: Control of chronic pain  Outcome: Ongoing     Problem: Falls - Risk of:  Goal: Will remain free from falls  Description: Will remain free from falls  Outcome: Ongoing  Goal: Absence of physical injury  Description: Absence of physical injury  Outcome: Ongoing     Problem: Sensory:  Goal: Ability to identify factors that increase the pain will improve  Description: Ability to identify factors that increase the pain will improve  Outcome: Ongoing  Goal: Ability to demonstrate ways to enhance comfort will improve  Description: Ability to demonstrate ways to enhance comfort will improve  Outcome: Ongoing  Goal: General experience of comfort will improve  Description: General experience of comfort will improve  Outcome: Ongoing
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
## 2021-09-20 NOTE — PROGRESS NOTES
Hip surgery    Progress Note    Subjective:     Post-Operative Day: 4 Status Post right hip femoral neck plating for right femoral neck fracture  Systemic or Specific Complaints:No Complaints    Objective:     CURRENT VITALS:  /74   Pulse 73   Temp 98.4 °F (36.9 °C) (Oral)   Resp 18   Ht 5' 11\" (1.803 m)   Wt 276 lb 3.8 oz (125.3 kg)   SpO2 92%   BMI 38.53 kg/m²     General: alert, appears stated age and cooperative   Wound: No Erythema, No Edema, No Drainage and mepilex dressing CDI   Motion: Painful range of Motion   DVT Exam: No evidence of DVT seen on physical exam.  Negative Lana's sign. No significant calf/ankle edema. NVI in lower extremity. Thigh swollen but soft. Moving foot and ankle. Data Review  Recent Labs     09/18/21  0523 09/19/21  0540 09/20/21  0532   WBC 10.5 9.9 8.4   RBC 3.44* 3.49* 3.39*   HGB 10.7* 11.0* 10.6*   HCT 31.1* 31.6* 30.3*   MCV 90.3 90.4 89.5   MCH 31.1 31.5* 31.1   MCHC 34.5 34.9 34.8   RDW 12.6 12.4 12.6    264 282       Assessment:     Status Post right femoral neck plating for right femoral neck fracture. Complications: Restricted Motion secondary to pain. Secondary Diagnoses:   Post-operative pain: Patient reports to have mild pain to the right hip that is exacerbated by movement. Relieved by rest, ice, and pain medication. Reports the pain to be slightly improving each day. Acute blood loss anemia post-operatively: Secondary to fracture and surgery. Patient hgb this morning 10.6. There are no signs of active/actue bleeding. The thigh is edematous however supple. There are no signs of active bleeding, patient is asymptomatic and remains hemodynamically stable. Will continue to monitor with daily CBC's. Hypoxia: Improving. CT scan of the chest ruled out PE however found to have pneumonia and atelectasis in which medicine is treating.      Hyponatremia: resolved     Leukocytosis: resolved     Patient may discharge to SNF vs [facility] from orthopedic standpoint. He is to follow up in two weeks in the outpatient orthopedic clinic. Pain scripts have been placed on patients chart for discharge. Plan:      1: Continues current post-op course :  2:  Continue Deep venous thrombosis prophylaxis: Lovenox  3:  Continue physical therapy:25% partial weight bearing to operative extremity with walker for assistance   4:  Continue Pain Control: Add zanaflex 4 mg every 6 hours PRN for muscle spasms, and change roxicodone from one table to one to two tablets every 4 hours PRN. 5. Discharge planning: Acute rehab referral placed.

## 2021-09-20 NOTE — PROGRESS NOTES
MV Settings:          No results for input(s): PHART, UHA1LYC, PO2ART, KRF1KQH, BEART, C3XYTAUY in the last 72 hours. O2 Device: None (Room air)  O2 Flow Rate (L/min): 5 L/min    ADULT DIET; Regular     MEDICATIONS during current hospitalization:    Continuous Infusions:   sodium chloride      sodium chloride         Scheduled Meds:   amoxicillin-clavulanate  1 tablet Oral 2 times per day    lactobacillus acidophilus  4 tablet Oral TID    sodium chloride flush  10 mL IntraVENous 2 times per day    acetaminophen  650 mg Oral Q6H    sennosides-docusate sodium  1 tablet Oral BID    enoxaparin  40 mg SubCUTAneous Daily    abiraterone acetate  1,000 mg Oral Daily    predniSONE  5 mg Oral Daily    sodium chloride flush  5-40 mL IntraVENous 2 times per day       PRN Meds:oxyCODONE **OR** oxyCODONE, tiZANidine, sodium chloride flush, sodium chloride, magnesium hydroxide, famotidine (PEPCID) injection, sodium chloride flush, sodium chloride, ondansetron **OR** ondansetron, polyethylene glycol, morphine    Radiology  XR CHEST (SINGLE VIEW FRONTAL)    Result Date: 9/15/2021  EXAMINATION/TECHNIQUE:  XR HIP RIGHT (2-3 VIEWS), XR FEMUR RIGHT (MIN 2 VIEWS), XR CHEST (SINGLE VIEW FRONTAL) HISTORY:  Fall with hip pain. COMPARISON: None RESULT: Bony pelvis/right hip/right femur: Acute right femoral neck fracture involving mostly the subcapital region, with associated impaction, with minimal displacement. Remainder of the bony pelvis appears grossly intact without other fracture. No distinct acute other fracture involving the right femur. Alignment at the hip joint appears maintained. Degenerative changes lumbar spine. SI joints and pubic symphysis maintained. Chest x-ray:No consolidation. No pleural effusion. No pneumothorax. Normal pulmonary vascular pattern. Normal cardiomediastinal silhouette. No acute osseous findings. No other significant abnormality. Acute right femoral neck fracture.  No acute findings within the thorax. XR HIP RIGHT (2-3 VIEWS)    Result Date: 9/15/2021  EXAMINATION/TECHNIQUE:  XR HIP RIGHT (2-3 VIEWS), XR FEMUR RIGHT (MIN 2 VIEWS), XR CHEST (SINGLE VIEW FRONTAL) HISTORY:  Fall with hip pain. COMPARISON: None RESULT: Bony pelvis/right hip/right femur: Acute right femoral neck fracture involving mostly the subcapital region, with associated impaction, with minimal displacement. Remainder of the bony pelvis appears grossly intact without other fracture. No distinct acute other fracture involving the right femur. Alignment at the hip joint appears maintained. Degenerative changes lumbar spine. SI joints and pubic symphysis maintained. Chest x-ray:No consolidation. No pleural effusion. No pneumothorax. Normal pulmonary vascular pattern. Normal cardiomediastinal silhouette. No acute osseous findings. No other significant abnormality. Acute right femoral neck fracture. No acute findings within the thorax. XR FEMUR RIGHT (MIN 2 VIEWS)    Result Date: 9/15/2021  EXAMINATION/TECHNIQUE:  XR HIP RIGHT (2-3 VIEWS), XR FEMUR RIGHT (MIN 2 VIEWS), XR CHEST (SINGLE VIEW FRONTAL) HISTORY:  Fall with hip pain. COMPARISON: None RESULT: Bony pelvis/right hip/right femur: Acute right femoral neck fracture involving mostly the subcapital region, with associated impaction, with minimal displacement. Remainder of the bony pelvis appears grossly intact without other fracture. No distinct acute other fracture involving the right femur. Alignment at the hip joint appears maintained. Degenerative changes lumbar spine. SI joints and pubic symphysis maintained. Chest x-ray:No consolidation. No pleural effusion. No pneumothorax. Normal pulmonary vascular pattern. Normal cardiomediastinal silhouette. No acute osseous findings. No other significant abnormality. Acute right femoral neck fracture. No acute findings within the thorax.     CTA CHEST W WO CONTRAST    Result Date: 9/17/2021  The EXAMINATION: CT scan of the chest with contrast (pulmonary embolism protocol) INDICATION: Short of breath. Recent ORIF right femur COMPARISON: None TECHNIQUE: Helical CT was performed through the chest utilizing 100 cc of Isovue-300 intravenous contrast.  Images were obtained with bolus tracking in order to opacify the pulmonary arteries. Both MIP and 3D volume rendered reconstructions were performed. FINDINGS: There are no findings a central, proximal or proximal-segmental pulmonary emboli. There are diffuse areas of infiltrates consolidations throughout the right lower lobe and left lower lobe. There are small bilateral pleural effusions. Right greater than left. No pneumothoraces. No significant periaortic, pretracheal perihilar or subcarinal adenopathy. Field-of-view there is a small hiatal hernia. Osseous structures are intact. 1.IMPRESSION: 2.NO FINDINGS A CENTRAL, PROXIMAL PROXIMAL-SEGMENTAL PORT EMBOLI. 3.BIBASILAR AREAS OF PATCHY TO COALESCENT INFILTRATES CONSOLIDATION WITHIN THE RIGHT LOWER LOBE AND LEFT LOWER LOBE WITH SMALL BILATERAL PLEURAL EFFUSIONS RIGHT GREATER THAN LEFT. CONSIDER BOTH TYPICAL AND ATYPICAL INFECTIOUS ETIOLOGY AS WELL AS POSSIBLE  ASPIRATION ETIOLOGY. Carlos Randolph All CT scans at this facility use dose modulation, iterative reconstruction, and/or weight based dosing when appropriate to reduce radiation dose to as low as reasonably achievable. XR CHEST PORTABLE    Result Date: 9/19/2021  Exam: XR CHEST PORTABLE History: Bibasilar pneumonia and atelectasis. Technique: AP portable view of the chest obtained. Comparison: CT chest September 17, 2021 and chest radiographs September 15, 2020 oh Findings: The cardiomediastinal silhouette is within normal limits. No pneumothorax, pleural effusion, or consolidation identified by radiography. The previously seen lung abnormalities on recent CT are not well visualized on this examination. No acute osseous abnormality.      No radiographic evidence of acute intrathoracic process. Previously seen atelectasis/infiltrate on CT is not well visualized by radiography. FLUORO FOR SURGICAL PROCEDURES    Result Date: 9/16/2021  EXAMINATION: FLUORO FOR SURGICAL PROCEDURES CLINICAL HISTORY:  pain COMPARISONS: None available. FINDINGS: Fluoroscopic assistance was provided during ORIF of the right femur The total radiation time is 47.7 seconds, radiation dose is 8.94 mGy. Intraprocedural fluoroscopic support has been provided. Please refer to the procedure report. IMPRESSION AND SUGGESTION:  1. Acute right femoral neck fracture status post surgery  2. Bibasilar consolidation with patchy infiltration right lower lobe rule out aspiration  3. Small bilateral pleural effusion follow-up fluid overload  4. Obesity with  hypoventilation and bibasilar atelectasis. 5. Acute respiratory failure secondary to above, improving  6. Metastatic prostate cancer    Patient is feeling much better. No shortness of breath. No cough. No fever said he has no cough he has no fever. Chest x-ray done  showing no acute intrathoracic process. He is switched to p.o. Augmentin. Room air O2 saturation 92%. Okay to discharge    NOTE: This report was transcribed using voice recognition software. Every effort was made to ensure accuracy; however, inadvertent computerized transcription errors may be present.       Electronically signed by Michael Peralta MD, FCCP on 9/20/2021 at 4:33 PM

## 2021-09-20 NOTE — CARE COORDINATION
Updates sent to Kita Avalos at [facility]. Precert to be started today.

## 2021-09-21 LAB
ALBUMIN SERPL-MCNC: 3.4 G/DL (ref 3.5–4.6)
ANION GAP SERPL CALCULATED.3IONS-SCNC: 16 MEQ/L (ref 9–15)
BUN BLDV-MCNC: 15 MG/DL (ref 8–23)
CALCIUM SERPL-MCNC: 8.7 MG/DL (ref 8.5–9.9)
CHLORIDE BLD-SCNC: 107 MEQ/L (ref 95–107)
CO2: 21 MEQ/L (ref 20–31)
CREAT SERPL-MCNC: 0.66 MG/DL (ref 0.7–1.2)
GFR AFRICAN AMERICAN: >60
GFR NON-AFRICAN AMERICAN: >60
GLUCOSE BLD-MCNC: 89 MG/DL (ref 70–99)
HCT VFR BLD CALC: 31 % (ref 42–52)
HEMOGLOBIN: 10.6 G/DL (ref 14–18)
MAGNESIUM: 2.2 MG/DL (ref 1.7–2.4)
MCH RBC QN AUTO: 31 PG (ref 27–31.3)
MCHC RBC AUTO-ENTMCNC: 34.1 % (ref 33–37)
MCV RBC AUTO: 90.9 FL (ref 80–100)
PDW BLD-RTO: 12.7 % (ref 11.5–14.5)
PHOSPHORUS: 3.5 MG/DL (ref 2.3–4.8)
PLATELET # BLD: 319 K/UL (ref 130–400)
POTASSIUM SERPL-SCNC: 3.7 MEQ/L (ref 3.4–4.9)
RBC # BLD: 3.41 M/UL (ref 4.7–6.1)
SODIUM BLD-SCNC: 144 MEQ/L (ref 135–144)
WBC # BLD: 9 K/UL (ref 4.8–10.8)

## 2021-09-21 PROCEDURE — 85027 COMPLETE CBC AUTOMATED: CPT

## 2021-09-21 PROCEDURE — 36415 COLL VENOUS BLD VENIPUNCTURE: CPT

## 2021-09-21 PROCEDURE — 99232 SBSQ HOSP IP/OBS MODERATE 35: CPT | Performed by: INTERNAL MEDICINE

## 2021-09-21 PROCEDURE — 80069 RENAL FUNCTION PANEL: CPT

## 2021-09-21 PROCEDURE — 99231 SBSQ HOSP IP/OBS SF/LOW 25: CPT | Performed by: PHYSICAL MEDICINE & REHABILITATION

## 2021-09-21 PROCEDURE — 83735 ASSAY OF MAGNESIUM: CPT

## 2021-09-21 PROCEDURE — 97116 GAIT TRAINING THERAPY: CPT

## 2021-09-21 PROCEDURE — 6370000000 HC RX 637 (ALT 250 FOR IP): Performed by: INTERNAL MEDICINE

## 2021-09-21 RX ORDER — OXYCODONE HYDROCHLORIDE 5 MG/1
5 TABLET ORAL EVERY 4 HOURS PRN
Status: CANCELLED | OUTPATIENT
Start: 2021-09-21

## 2021-09-21 RX ORDER — SENNA AND DOCUSATE SODIUM 50; 8.6 MG/1; MG/1
1 TABLET, FILM COATED ORAL 2 TIMES DAILY
Status: CANCELLED | OUTPATIENT
Start: 2021-09-21

## 2021-09-21 RX ORDER — PREDNISONE 1 MG/1
5 TABLET ORAL DAILY
Status: CANCELLED | OUTPATIENT
Start: 2021-09-22

## 2021-09-21 RX ORDER — ACETAMINOPHEN 325 MG/1
650 TABLET ORAL EVERY 6 HOURS
Status: CANCELLED | OUTPATIENT
Start: 2021-09-21

## 2021-09-21 RX ORDER — TIZANIDINE 4 MG/1
4 TABLET ORAL EVERY 6 HOURS PRN
Status: CANCELLED | OUTPATIENT
Start: 2021-09-21

## 2021-09-21 RX ORDER — ONDANSETRON 4 MG/1
4 TABLET, ORALLY DISINTEGRATING ORAL EVERY 8 HOURS PRN
Status: CANCELLED | OUTPATIENT
Start: 2021-09-21

## 2021-09-21 RX ORDER — OXYCODONE HYDROCHLORIDE 5 MG/1
10 TABLET ORAL EVERY 4 HOURS PRN
Status: CANCELLED | OUTPATIENT
Start: 2021-09-21

## 2021-09-21 RX ORDER — MORPHINE SULFATE 2 MG/ML
2 INJECTION, SOLUTION INTRAMUSCULAR; INTRAVENOUS
Status: CANCELLED | OUTPATIENT
Start: 2021-09-21

## 2021-09-21 RX ORDER — AMOXICILLIN AND CLAVULANATE POTASSIUM 875; 125 MG/1; MG/1
1 TABLET, FILM COATED ORAL EVERY 12 HOURS SCHEDULED
Qty: 6 TABLET | Refills: 0 | Status: SHIPPED | OUTPATIENT
Start: 2021-09-21 | End: 2021-09-24

## 2021-09-21 RX ORDER — L. ACIDOPHILUS/L.BULGARICUS 1MM CELL
4 TABLET ORAL 3 TIMES DAILY
Status: CANCELLED | OUTPATIENT
Start: 2021-09-21

## 2021-09-21 RX ORDER — ONDANSETRON 2 MG/ML
4 INJECTION INTRAMUSCULAR; INTRAVENOUS EVERY 6 HOURS PRN
Status: CANCELLED | OUTPATIENT
Start: 2021-09-21

## 2021-09-21 RX ORDER — ABIRATERONE ACETATE 250 MG/1
1000 TABLET ORAL DAILY
Status: CANCELLED | OUTPATIENT
Start: 2021-09-22

## 2021-09-21 RX ORDER — POLYETHYLENE GLYCOL 3350 17 G/17G
17 POWDER, FOR SOLUTION ORAL DAILY PRN
Status: CANCELLED | OUTPATIENT
Start: 2021-09-21

## 2021-09-21 RX ORDER — AMOXICILLIN AND CLAVULANATE POTASSIUM 875; 125 MG/1; MG/1
1 TABLET, FILM COATED ORAL EVERY 12 HOURS SCHEDULED
Status: CANCELLED | OUTPATIENT
Start: 2021-09-21 | End: 2021-09-24

## 2021-09-21 RX ADMIN — ABIRATERONE ACETATE 1000 MG: 250 TABLET ORAL at 10:16

## 2021-09-21 RX ADMIN — LACTOBACILLUS TAB 4 TABLET: TAB at 10:14

## 2021-09-21 RX ADMIN — PREDNISONE 5 MG: 1 TABLET ORAL at 12:12

## 2021-09-21 RX ADMIN — AMOXICILLIN AND CLAVULANATE POTASSIUM 1 TABLET: 875; 125 TABLET, FILM COATED ORAL at 10:14

## 2021-09-21 ASSESSMENT — PAIN SCALES - GENERAL
PAINLEVEL_OUTOF10: 0
PAINLEVEL_OUTOF10: 6
PAINLEVEL_OUTOF10: 0
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
## 2021-09-21 NOTE — PROGRESS NOTES
Physician Progress Note      Joanna Solis  CSN #:                  [id number]  :                       1960  ADMIT DATE:       9/15/2021 6:02 PM  [address] [place] DATE:        2021 4:30 PM  RESPONDING  PROVIDER #:        Misty Khan DO          QUERY TEXT:    Patient with acute right femoral neck fracture status post surgery. Noted   documentation of bibasilar consolidation with patchy infiltration right lower   lobe rule out aspiration 21 Dr. Lola Zavaleta and acute hypoxic respiratory   failure--?likely related to hypoventilation, atelectasis vs PNA 21 Dr. SCHAEFER [facility]. If possible, please document in progress notes and discharge   summary if you are evaluating and /or treating any of the following: The medical record reflects the following:  Risk Factors: obesity with hypoventilation and bibasilar atelectasis     malignant neoplasm of prostate  Clinical Indicators:   Dr. Lola Zavaleta: Bibasilar consolidation with patchy infiltration right lower   lobe rule out aspiration  Small bilateral pleural effusion follow-up fluid   overload  Acute respiratory failure secondary to above, improving  Patient is   feeling much better. No shortness of breath. No cough. No fever said he has   no cough he has no fever. Chest x-ray done showing no acute intrathoracic   process. He is switched to p.o. Augmentin. Room air O2 saturation 92%.  Dr. SCHAEFER [facility]: Acute hypoxic respiratory failure- resolved likely related   to hypoventilation, atelectasis vs PNA, volume overload?in the postoperative   setting -?was? requiring 4-5 liters of O2 via NC  CTA of the chest was negative   for PE, it showed bilateral ?infiltrates / consolidations of RRL and LLL,   bilateral pleural effusions-?treated with? IV Zosyn, switched to PO Augmentin  -?s/p? IV lasix? x 2- improving, on RA today, CXR was negative  Treatment: CTA  CXR   IV Zosyn  IV Lasix  pulmonology  Options provided:  -- Pneumonia confirmed  -- Pneumonia ruled out  -- Other - I will add my own diagnosis  -- Disagree - Not applicable / Not valid  -- Disagree - Clinically unable to determine / Unknown  -- Refer to Clinical Documentation Reviewer    PROVIDER RESPONSE TEXT:    This patient has pneumonia.     Query created by: Sheron Gleason on 9/21/2021 8:48 AM      Electronically signed by:  Ivonne Pena DO 9/21/2021 4:44 PM

## 2021-09-21 NOTE — PROGRESS NOTES
Subjective: The patient complains of severe acute on chronic progressive fatigue and right hip pain --dt right femur fracture partially relieved by rest, PT, OT and meds and exacerbated by exertion and recent illness. I am concerned about patients medical complexities-low K. We are still waiting to find out if he was certified through his insurance to go to acute rehab at the [facility] which is his choice. Stop by to check on process to make sure there was nothing I could do to make sure he gets to his acute level rehab we still have not heard from insurance his daughter is concerned that that [place]  said that they were not even aware that he was in the acute center and they had not received any of those records. I checked in on that and apparently there certification process for acute is completely separate from a certified postacute or rehab and that person would not have had any access to the acute stay records and that it is typical for sicknesses have a do things. It is upsetting however that they gave the family information that was upsetting and concerning to them when it was unnecessary to put them in such stress. Reviewed recent nursing note and discussed current status and planned care with acute care providers, \"Pt refused to wear SEDs, telemetry monitor or oxygen at this time. He has been educated and verbalized an understanding of the risks. Also refuse his HS dose of Tylenol. Pt is resting quietyly in bed at this time, personal belongings and call light are within reach. Will continue to monitor. \".    ROS x10: The patient also complains of severely impaired mobility and activities of daily living. Otherwise no new problems with vision, hearing, nose, mouth, throat, dermal, cardiovascular, GI, , pulmonary, musculoskeletal, psychiatric or neurological. See Rehab consult on Rehab chart .        Vital signs:  BP (!) 142/67   Pulse 76   Temp 99.1 °F (37.3 °C) (Oral) breaks. Occupational therapy: FIMS:   ,  , Assessment: Pt. is a 59year old man from home who presents to [facility] with the above deficits s/p fall with R femor fx s/p fixation. Pt. demonstrates decreased balance, endurance and strength. Pt. would benefit from continued OT to maximize independence and safety with ADL tasks. Speech therapy: FIMS:      SPEECH THERAPY               Diet/Swallow:                           COGNITION  OT:    SP:           Lab/X-ray studies reviewed, analyzed and discussed with patient and staff:   Recent Results (from the past 24 hour(s))   CBC    Collection Time: 09/21/21  5:10 AM   Result Value Ref Range    WBC 9.0 4.8 - 10.8 K/uL    RBC 3.41 (L) 4.70 - 6.10 M/uL    Hemoglobin 10.6 (L) 14.0 - 18.0 g/dL    Hematocrit 31.0 (L) 42.0 - 52.0 %    MCV 90.9 80.0 - 100.0 fL    MCH 31.0 27.0 - 31.3 pg    MCHC 34.1 33.0 - 37.0 %    RDW 12.7 11.5 - 14.5 %    Platelets 691 288 - 348 K/uL   RENAL FUNCTION PANEL    Collection Time: 09/21/21  5:10 AM   Result Value Ref Range    Sodium 144 135 - 144 mEq/L    Potassium 3.7 3.4 - 4.9 mEq/L    Chloride 107 95 - 107 mEq/L    CO2 21 20 - 31 mEq/L    Anion Gap 16 (H) 9 - 15 mEq/L    Glucose 89 70 - 99 mg/dL    BUN 15 8 - 23 mg/dL    CREATININE 0.66 (L) 0.70 - 1.20 mg/dL    GFR Non-African American >60.0 >60    GFR  >60.0 >60    Calcium 8.7 8.5 - 9.9 mg/dL    Phosphorus 3.5 2.3 - 4.8 mg/dL    Albumin 3.4 (L) 3.5 - 4.6 g/dL   Magnesium    Collection Time: 09/21/21  5:10 AM   Result Value Ref Range    Magnesium 2.2 1.7 - 2.4 mg/dL       Previous extensive, complex labs, notes and diagnostics reviewed and analyzed. ALLERGIES:    Allergies as of 09/15/2021    (No Known Allergies)      (please also verify by checking [facility])     Complex Physical Medicine & Rehab Issues Assess & Plan:   1. Severe abnormality of gait and mobility and impaired self-care and ADL's secondary to acute right femur Fx .   Functional and medical status reassessed regarding patients ability to participate in therapies and patient found to be able to participate in  acute intensive comprehensive inpatient rehabilitation program including PT/OT to improve balance, ambulation, ADLs, and to improve the P/AROM. It is my opinion that they will be able to tolerate 3 hours of therapy a day and benefit from it at an acute level. 2. Bowel constipation and Bladder dysfunction overactive bladder:  frequent toileting, ambulate to bathroom with assistance, check post void residuals. Check for C.difficile x1 if >2 loose stools in 24 hours, continue bowel & bladder program.  Monitor for UTI symptoms including lethargy and confusion  3. Severe right hip pain and generalized OA pain: reassess pain every shift and prior to and after each therapy session, give prn  Tylenol, modalities prn in therapy, consider Lidoderm, K-pad prn.   4. Skin breakdown risk:  continue pressure relief program.  Daily skin exams and reports from nursing. 5. Severe fatigue due to immobility and nutritional deficits: Add vitamin B12 vitamin D and CoQ10 titrate dosing and add protein supplementation with low carb content. 6. Complex discharge planning:   Is from the [facility] and wishes to go to the [facility] for his rehab because it is closer to home. I have been trying to help documentation purposes so that he gets placed at the most  effective efficient level of care-we are still waiting for Jonathan to give the okay and will have case management work with patient and family regarding his actual and accurate process for getting him there. Complex Active General Medical Issues that complicate care Assess & Plan:     1. Principal Problem:    Closed fracture of neck of right femur [facility])  Active Problems:    Fall at home, initial encounter    Metastatic adenocarcinoma to prostate [facility])    Essential hypertension    Gait abnormality  Resolved Problems:    * No resolved hospital problems. Meño Link D.O., PM&R     Attending    [address]

## 2021-09-21 NOTE — PROGRESS NOTES
CLINICAL PHARMACY NOTE: MEDS TO BEDS    Total # of Prescriptions Filled: 1   The following medications were delivered to the patient:  · Augmentin 875mg tab    Additional Documentation:

## 2021-09-21 NOTE — PROGRESS NOTES
INPATIENT PROGRESS NOTES    PATIENT NAME: Giuliana Keating  MRN: [id number]  SERVICE DATE:  September 21, 2021   SERVICE TIME:  3:42 PM      PRIMARY SERVICE: Pulmonary Disease    CHIEF COMPLAIN: Pneumonia and atelectasis with hypoxia      INTERVAL HPI: Patient seen and examined at bedside, Interval Notes, orders reviewed. Nursing notes noted  Patient is feeling much better. He is going to rehab today he is on room air. O2 saturation 95%. He has no cough or shortness of breath. No fever or chills. He is doing incentive spirometer about 2500 cc. OBJECTIVE    Body mass index is 38.53 kg/m². PHYSICAL EXAM:  Vitals:  BP (!) 142/67   Pulse 76   Temp 99.1 °F (37.3 °C) (Oral)   Resp 18   Ht 5' 11\" (1.803 m)   Wt 276 lb 3.8 oz (125.3 kg)   SpO2 95%   BMI 38.53 kg/m²   General:Alert, awake . comfortable in bed, No distress. Head: Atraumatic , Normocephalic   Eyes: PERRL. No sclera icterus. No conjunctival injection. No discharge   ENT: No nasal  discharge. Pharynx clear. Neck:  Trachea midline. No thyromegaly, no JVD, No cervical adenopathy. Chest : Bilaterally symmetrical ,Normal effort,  No accessory muscle use  Lung : . Fair BS bilateral, decreased BS at bases. No Rales. No wheezing. No rhonchi. Heart[de-identified] Normal  rate. Regular rhythm. No mumur ,  Rub or gallop  ABD: Non-tender. Non-distended. No masses. No organmegaly. Normal bowel sounds. No hernia.   Ext : No Pitting both leg , No Cyanosis No clubbing, status post right hip surgery  Neuro: no focal weakness          DATA:   Recent Labs     09/20/21  0532 09/21/21  0510   WBC 8.4 9.0   HGB 10.6* 10.6*   HCT 30.3* 31.0*   MCV 89.5 90.9    319     Recent Labs     09/20/21  0532 09/20/21  0532 09/21/21  0510     --  144   K 3.1*  --  3.7     --  107   CO2 23  --  21   BUN 16  --  15   CREATININE 0.65*  --  0.66*   GLUCOSE 83  --  89   CALCIUM 8.5  --  8.7   LABALBU 3.1*  --  3.4*   LABGLOM >60.0   < > >60.0   GFRAA >60.0   < > >60.0    < > = values in this interval not displayed. MV Settings:          No results for input(s): PHART, HFP7PEY, PO2ART, GJK7TJM, BEART, G3MLXOAU in the last 72 hours. O2 Device: None (Room air)  O2 Flow Rate (L/min): 5 L/min    ADULT DIET; Regular     MEDICATIONS during current hospitalization:    Continuous Infusions:   sodium chloride      sodium chloride         Scheduled Meds:   amoxicillin-clavulanate  1 tablet Oral 2 times per day    lactobacillus acidophilus  4 tablet Oral TID    sodium chloride flush  10 mL IntraVENous 2 times per day    acetaminophen  650 mg Oral Q6H    sennosides-docusate sodium  1 tablet Oral BID    enoxaparin  40 mg SubCUTAneous Daily    abiraterone acetate  1,000 mg Oral Daily    predniSONE  5 mg Oral Daily    sodium chloride flush  5-40 mL IntraVENous 2 times per day       PRN Meds:oxyCODONE **OR** oxyCODONE, tiZANidine, sodium chloride flush, sodium chloride, magnesium hydroxide, famotidine (PEPCID) injection, sodium chloride flush, sodium chloride, ondansetron **OR** ondansetron, polyethylene glycol, morphine    Radiology  XR CHEST (SINGLE VIEW FRONTAL)    Result Date: 9/15/2021  EXAMINATION/TECHNIQUE:  XR HIP RIGHT (2-3 VIEWS), XR FEMUR RIGHT (MIN 2 VIEWS), XR CHEST (SINGLE VIEW FRONTAL) HISTORY:  Fall with hip pain. COMPARISON: None RESULT: Bony pelvis/right hip/right femur: Acute right femoral neck fracture involving mostly the subcapital region, with associated impaction, with minimal displacement. Remainder of the bony pelvis appears grossly intact without other fracture. No distinct acute other fracture involving the right femur. Alignment at the hip joint appears maintained. Degenerative changes lumbar spine. SI joints and pubic symphysis maintained. Chest x-ray:No consolidation. No pleural effusion. No pneumothorax. Normal pulmonary vascular pattern. Normal cardiomediastinal silhouette. No acute osseous findings. No other significant abnormality.      Acute right femoral neck fracture. No acute findings within the thorax. XR HIP RIGHT (2-3 VIEWS)    Result Date: 9/15/2021  EXAMINATION/TECHNIQUE:  XR HIP RIGHT (2-3 VIEWS), XR FEMUR RIGHT (MIN 2 VIEWS), XR CHEST (SINGLE VIEW FRONTAL) HISTORY:  Fall with hip pain. COMPARISON: None RESULT: Bony pelvis/right hip/right femur: Acute right femoral neck fracture involving mostly the subcapital region, with associated impaction, with minimal displacement. Remainder of the bony pelvis appears grossly intact without other fracture. No distinct acute other fracture involving the right femur. Alignment at the hip joint appears maintained. Degenerative changes lumbar spine. SI joints and pubic symphysis maintained. Chest x-ray:No consolidation. No pleural effusion. No pneumothorax. Normal pulmonary vascular pattern. Normal cardiomediastinal silhouette. No acute osseous findings. No other significant abnormality. Acute right femoral neck fracture. No acute findings within the thorax. XR FEMUR RIGHT (MIN 2 VIEWS)    Result Date: 9/15/2021  EXAMINATION/TECHNIQUE:  XR HIP RIGHT (2-3 VIEWS), XR FEMUR RIGHT (MIN 2 VIEWS), XR CHEST (SINGLE VIEW FRONTAL) HISTORY:  Fall with hip pain. COMPARISON: None RESULT: Bony pelvis/right hip/right femur: Acute right femoral neck fracture involving mostly the subcapital region, with associated impaction, with minimal displacement. Remainder of the bony pelvis appears grossly intact without other fracture. No distinct acute other fracture involving the right femur. Alignment at the hip joint appears maintained. Degenerative changes lumbar spine. SI joints and pubic symphysis maintained. Chest x-ray:No consolidation. No pleural effusion. No pneumothorax. Normal pulmonary vascular pattern. Normal cardiomediastinal silhouette. No acute osseous findings. No other significant abnormality. Acute right femoral neck fracture. No acute findings within the thorax.     CTA CHEST W WO CONTRAST    Result Date: 9/17/2021  The EXAMINATION: CT scan of the chest with contrast (pulmonary embolism protocol) INDICATION: Short of breath. Recent ORIF right femur COMPARISON: None TECHNIQUE: Helical CT was performed through the chest utilizing 100 cc of Isovue-300 intravenous contrast.  Images were obtained with bolus tracking in order to opacify the pulmonary arteries. Both MIP and 3D volume rendered reconstructions were performed. FINDINGS: There are no findings a central, proximal or proximal-segmental pulmonary emboli. There are diffuse areas of infiltrates consolidations throughout the right lower lobe and left lower lobe. There are small bilateral pleural effusions. Right greater than left. No pneumothoraces. No significant periaortic, pretracheal perihilar or subcarinal adenopathy. Field-of-view there is a small hiatal hernia. Osseous structures are intact. 1.IMPRESSION: 2.NO FINDINGS A CENTRAL, PROXIMAL PROXIMAL-SEGMENTAL PORT EMBOLI. 3.BIBASILAR AREAS OF PATCHY TO COALESCENT INFILTRATES CONSOLIDATION WITHIN THE RIGHT LOWER LOBE AND LEFT LOWER LOBE WITH SMALL BILATERAL PLEURAL EFFUSIONS RIGHT GREATER THAN LEFT. CONSIDER BOTH TYPICAL AND ATYPICAL INFECTIOUS ETIOLOGY AS WELL AS POSSIBLE  ASPIRATION ETIOLOGY. Mike Mathis All CT scans at this facility use dose modulation, iterative reconstruction, and/or weight based dosing when appropriate to reduce radiation dose to as low as reasonably achievable. XR CHEST PORTABLE    Result Date: 9/19/2021  Exam: XR CHEST PORTABLE History: Bibasilar pneumonia and atelectasis. Technique: AP portable view of the chest obtained. Comparison: CT chest September 17, 2021 and chest radiographs September 15, 2020 oh Findings: The cardiomediastinal silhouette is within normal limits. No pneumothorax, pleural effusion, or consolidation identified by radiography. The previously seen lung abnormalities on recent CT are not well visualized on this examination.   No acute osseous abnormality. No radiographic evidence of acute intrathoracic process. Previously seen atelectasis/infiltrate on CT is not well visualized by radiography. FLUORO FOR SURGICAL PROCEDURES    Result Date: 9/16/2021  EXAMINATION: FLUORO FOR SURGICAL PROCEDURES CLINICAL HISTORY:  pain COMPARISONS: None available. FINDINGS: Fluoroscopic assistance was provided during ORIF of the right femur The total radiation time is 47.7 seconds, radiation dose is 8.94 mGy. Intraprocedural fluoroscopic support has been provided. Please refer to the procedure report. IMPRESSION AND SUGGESTION:  1. Acute right femoral neck fracture status post surgery  2. Bibasilar consolidation with patchy infiltration right lower lobe rule out aspiration  3. Small bilateral pleural effusion follow-up fluid overload  4. Obesity with  hypoventilation and bibasilar atelectasis. 5. Acute respiratory failure secondary to above, improving  6. Metastatic prostate cancer    Patient is feeling much better. Going to rehab today. He said everything is approved. No shortness of breath. No cough. No fever said he has no cough he has no fever. Chest x-ray done  showing no acute intrathoracic process. He is switched to p.o. Augmentin. Room air O2 saturation 95%. Okay to discharge    NOTE: This report was transcribed using voice recognition software. Every effort was made to ensure accuracy; however, inadvertent computerized transcription errors may be present.       Electronically signed by Elle Garza MD, FCCP on 9/21/2021 at 3:42 PM
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
## 2021-09-21 NOTE — PLAN OF CARE
Problem: Pain:  Goal: Pain level will decrease  Description: Pain level will decrease  Outcome: Completed  Goal: Control of acute pain  Description: Control of acute pain  Outcome: Completed  Goal: Control of chronic pain  Description: Control of chronic pain  Outcome: Completed     Problem: IP DRESSINGS LOWER EXTREMITIES  Goal: LTG - Patient will safely utilize adaptive techniques/equipment to dress lower body  Outcome: Completed     Problem: IP MOBILITY  Goal: LTG - patient will demonstrate safe mobility requirements  Outcome: Completed     Problem: Falls - Risk of:  Goal: Will remain free from falls  Description: Will remain free from falls  Outcome: Completed  Goal: Absence of physical injury  Description: Absence of physical injury  Outcome: Completed     Problem: Sensory:  Goal: Ability to identify factors that increase the pain will improve  Description: Ability to identify factors that increase the pain will improve  Outcome: Completed  Goal: Ability to demonstrate ways to enhance comfort will improve  Description: Ability to demonstrate ways to enhance comfort will improve  Outcome: Completed  Goal: General experience of comfort will improve  Description: General experience of comfort will improve  Outcome: Completed   Patient discharged to rehab. Care plan completed.

## 2021-09-21 NOTE — PROGRESS NOTES
Pt refused to wear SEDs, telemetry monitor or oxygen at this time. He has been educated and verbalized an understanding of the risks. Also refuse his HS dose of Tylenol. Pt is resting quietyly in bed at this time, personal belongings and call light are within reach. Will continue to monitor.
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
## 2021-09-21 NOTE — PROGRESS NOTES
Physician Progress Note      Essence Holm  CSN #:                  [id number]  :                       1960  ADMIT DATE:       9/15/2021 6:02 PM  DISCH DATE:  RESPONDING  PROVIDER #:        MICHELLE Rodriguez CNP          QUERY TEXT:    Pt admitted with displaced right femoral neck fracture. Pt noted to have   osteopenia and prostate cancer with metastasis to the bone If possible, please   document in progress notes and discharge summary if you are evaluating and/or   treating any of the following: The medical record reflects the following:  Risk Factors: History of osteopenia and prostate cancer stage 4 metastasizing   to the bones. Clinical Indicators:  H&P: No history of fractures in the past.  He has prostate cancer with   metastasis to the bone. He also had bone density scan done before and showed   borderline osteopenia.  Dr. Miky Bailey: discussed the fracture and pathology  my preference given   his osteoporotic bone and history is to proceed with a femoral neck plating   system which is a little bit of a stronger mechanical construct  Treatment: femur XR    FEMORAL NECK PLATING RIGHT HIP  Options provided:  -- Pathological right femoral neck femur fracture  -- Osteoporotic right femoral neck femur fracture  -- Osteoporotic right femoral neck fracture following fall which would not   usually break a normal, healthy bone  -- Traumatic right femoral neck fracture  -- Pathological and osteoporotic neck femur fracture  -- Other - I will add my own diagnosis  -- Disagree - Not applicable / Not valid  -- Disagree - Clinically unable to determine / Unknown  -- Refer to Clinical Documentation Reviewer    PROVIDER RESPONSE TEXT:    This patient has a traumatic fracture of the right femoral neck.     Query created by: Ajay Vilchis on 2021 11:11 AM      Electronically signed by:  Anibal Rodriguez CNP 2021 8:15 AM

## 2021-09-21 NOTE — PROGRESS NOTES
Physician Progress Note      PATIENTErmelinda Epley  CSN #:                  [id number]  :                       1960  ADMIT DATE:       9/15/2021 6:02 PM  [address] [place] DATE:  RESPONDING  PROVIDER #:        Holly Trent MD          QUERY TEXT:    Pt with acute right femoral neck fracture status post surgery. Pt noted to   have acute hypoxic respiratory failure - in the postoperative setting-   requiring 4-5 liters of O2 via NC.? If possible, please document in progress   notes and discharge summary:    The medical record reflects the following:  ?? Risk Factors: malignant neoplasm of prostate  ? ? Clinical Indicators:   Dr. Amy Pedraza: Acute hypoxic respiratory failure - in the postoperative   setting- requiring 4-5 liters of O2 via NC  - CTA of the chest was negative for PE, it showed bilateral  infiltrates /   consolidations of RRL and LLL, bilateral pleural effusions   Dr. Elpidio Hurley: fracture of right hip underwent for surgery. Patient was   hypoxic 87% on 3 L. Required to increase oxygen 4 to 5 L to keep saturation   above 90%. Bibasilar consolidation and patchy infiltration right base suggest   possibility of typical and atypical pneumonia and consider aspiration. He   denies any episode of vomiting. Pulmonary consulted for possible pneumonia and   hypoxia   Dr. SCHAEFER [facility]: Acute hypoxic respiratory failure- resolved -?likely   related to hypoventilation, atelectasis vs PNA, volume overload?in the   postoperative setting  ? ?  Treatment: CTA  CXR   IV Zosyn  IV Lasix  pulmonology  Options provided:  -- Acute hypoxic respiratory failure is a postoperative complication  -- Acute hypoxic respiratory failure is an expected/inherent condition that   occurred postoperatively and not a complication  -- Acute hypoxic respiratory failure  is not a postoperative complication, but   is due to other incidental risk factor, Please specify other incidental risk   factor  -- Other - I will add my own diagnosis  -- Disagree - Not applicable / Not valid  -- Disagree - Clinically unable to determine / Unknown  -- Refer to Clinical Documentation Reviewer    PROVIDER RESPONSE TEXT:    Acute hypoxic respiratory failure is not a postoperative complication but due   to obesity with hypoventilation causing Atelactasis and hypoxia due to   narcotics pain medication    Query created by: Alonso Acevedo on 9/21/2021 7:53 AM      Electronically signed by:  Jaylen Guzmán MD 9/21/2021 12:46 PM

## 2021-09-22 NOTE — PROGRESS NOTES
Physical Therapy  Facility/Department: [facility] G531/G639-25  Physical Therapy Discharge      NAME: Chavez Antony    : 1960 (59 y.o.)  MRN: [id number]    Account: [de-identified]  Gender: male      Patient has been discharged from acute care hospital. DC patient from current PT program.      Electronically signed by Isabela Robledo PT on 21 at 4:31 PM EDT

## 2021-10-13 ENCOUNTER — OFFICE VISIT (OUTPATIENT)
Dept: ORTHOPEDIC SURGERY | Age: 61
End: 2021-10-13
Payer: COMMERCIAL

## 2021-10-13 ENCOUNTER — HOSPITAL ENCOUNTER (OUTPATIENT)
Dept: ORTHOPEDIC SURGERY | Age: 61
Discharge: HOME | End: 2021-10-15
Payer: COMMERCIAL

## 2021-10-13 VITALS
HEART RATE: 88 BPM | BODY MASS INDEX: 35.98 KG/M2 | TEMPERATURE: 97.5 F | OXYGEN SATURATION: 95 % | HEIGHT: 71 IN | WEIGHT: 257 LBS | RESPIRATION RATE: 16 BRPM

## 2021-10-13 DIAGNOSIS — Z09 SURGERY FOLLOW-UP: Primary | ICD-10-CM

## 2021-10-13 DIAGNOSIS — Z09 SURGERY FOLLOW-UP: ICD-10-CM

## 2021-10-13 DIAGNOSIS — S72.001D CLOSED FRACTURE OF NECK OF RIGHT FEMUR WITH ROUTINE HEALING, SUBSEQUENT ENCOUNTER: ICD-10-CM

## 2021-10-13 PROCEDURE — 73552 X-RAY EXAM OF FEMUR 2/>: CPT

## 2021-10-13 PROCEDURE — 99024 POSTOP FOLLOW-UP VISIT: CPT | Performed by: ORTHOPAEDIC SURGERY

## 2021-10-13 PROCEDURE — 73552 X-RAY EXAM OF FEMUR 2/>: CPT | Performed by: ORTHOPAEDIC SURGERY

## 2021-10-13 RX ORDER — ACETAMINOPHEN 325 MG/1
650 TABLET ORAL EVERY 6 HOURS PRN
COMMUNITY
Start: 2021-09-29

## 2021-12-08 ENCOUNTER — OFFICE VISIT (OUTPATIENT)
Dept: ORTHOPEDIC SURGERY | Age: 61
End: 2021-12-08
Payer: COMMERCIAL

## 2021-12-08 ENCOUNTER — HOSPITAL ENCOUNTER (OUTPATIENT)
Dept: ORTHOPEDIC SURGERY | Age: 61
Discharge: HOME | End: 2021-12-10
Payer: COMMERCIAL

## 2021-12-08 VITALS
TEMPERATURE: 97.4 F | HEIGHT: 71 IN | HEART RATE: 74 BPM | BODY MASS INDEX: 35.98 KG/M2 | OXYGEN SATURATION: 96 % | WEIGHT: 257 LBS

## 2021-12-08 DIAGNOSIS — Z09 SURGERY FOLLOW-UP: Primary | ICD-10-CM

## 2021-12-08 DIAGNOSIS — S72.001D CLOSED FRACTURE OF NECK OF RIGHT FEMUR WITH ROUTINE HEALING, SUBSEQUENT ENCOUNTER: ICD-10-CM

## 2021-12-08 PROCEDURE — 99024 POSTOP FOLLOW-UP VISIT: CPT | Performed by: ORTHOPAEDIC SURGERY

## 2021-12-08 PROCEDURE — 73502 X-RAY EXAM HIP UNI 2-3 VIEWS: CPT

## 2021-12-08 PROCEDURE — 73502 X-RAY EXAM HIP UNI 2-3 VIEWS: CPT | Performed by: ORTHOPAEDIC SURGERY

## 2021-12-08 NOTE — PROGRESS NOTES
Subjective:      Patient ID: Keisha Quintana is a 59 y.o. male who presents today for:  Chief Complaint   Patient presents with    Follow-up     2 month follow up. HPI    Patient presents 2 months status post a right hip femoral neck plating system for an impacted femoral neck fracture. Patient is doing well. Patient is been weightbearing as tolerated on that hip since with our last evaluation 1 month ago. X-rays are taken of his hip and 2 projections. XR HIP 2-3 VW W PELVIS RIGHT    Result Date: 12/8/2021  The patient comes in 3 views of the hip pelvis and right hip are taken. .  An AP pelvis as well as 2 views the right hip demonstrate the femoral neck plate system in excellent position more poorly I see no signs of AVN or lysis around any of the componentry no evidence of loosening in the joint is well-maintained.       Past Medical History:   Diagnosis Date    Cancer [facility])     prostate stage 4     Past Surgical History:   Procedure Laterality Date    HIP SURGERY Right 9/16/2021    FEMORAL NECK PLATING RIGHT HIP SYNTHES ROOM 267 performed by Geraldine Holloway MD at [address] [place] [address] History     Socioeconomic History    Marital status:      Spouse name: Not on file    Number of children: Not on file    Years of education: Not on file    Highest education level: Not on file   Occupational History    Not on file   Tobacco Use    Smoking status: Never Smoker    Smokeless tobacco: Never Used   Substance and Sexual Activity    Alcohol use: Not on file     Comment: socailly    Drug use: Not on file    Sexual activity: Not on file   Other Topics Concern    Not on file   Social History Narrative    Lives With: Alone    Type of Home: Apartment ([facility] [place] [address] [address]    Home Layout: Bed/Bath upstairs, Two level, Laundry in basement    Home Access: Stairs to enter with rails    Entrance Stairs - Number of Steps: 5    Entrance Stairs - Rails:  (House on one side, rail on the other) Bathroom Shower/Tub: Tub/Shower unit    ADL Assistance: Independent    Homemaking Assistance: Independent    Ambulation Assistance: Independent    Transfer Assistance: Independent    Active : Yes    Type of occupation:  for [employer] firm- lots of walking, steps etc    Leisure & Hobbies: Biking    He has daughters they both live out of state. Social Determinants of Health     Financial Resource Strain:     Difficulty of Paying Living Expenses: Not on file   Food Insecurity:     Worried About Running Out of Food in the Last Year: Not on file    Ulices of Food in the Last Year: Not on file   Transportation Needs:     Lack of Transportation (Medical): Not on file    Lack of Transportation (Non-Medical): Not on file   Physical Activity:     Days of Exercise per Week: Not on file    Minutes of Exercise per Session: Not on file   Stress:     Feeling of Stress : Not on file   Social Connections:     Frequency of Communication with Friends and Family: Not on file    Frequency of Social Gatherings with Friends and Family: Not on file    Attends Mosque Services: Not on file    Active Member of [address] [place] [address] or Organizations: Not on file    Attends Club or Organization Meetings: Not on file    Marital Status: Not on file   Intimate Partner Violence:     Fear of Current or Ex-Partner: Not on file    Emotionally Abused: Not on file    Physically Abused: Not on file    Sexually Abused: Not on file   Housing Stability:     Unable to Pay for Housing in the Last Year: Not on file    Number of [place] in the Last Year: Not on file    Unstable Housing in the Last Year: Not on file     No family history on file.   No Known Allergies  Current Outpatient Medications on File Prior to Visit   Medication Sig Dispense Refill    acetaminophen (TYLENOL) 325 MG tablet Take 650 mg by mouth every 6 hours as needed      leuprolide (LUPRON) 30 MG injection Inject 30 mg into the muscle     

## 2024-01-01 NOTE — PROGRESS NOTES
ABORH O POSITIVE 2024 08:03 AM      Infant's Blood Type & Cord Screen  Lab Results   Component Value Date/Time    ABORH A POSITIVE 2024 12:00 PM    ANTGLOBIGG POS 2024 12:00 PM           Hospital Course / Problem List       Patient Active Problem List   Diagnosis   (none) - all problems resolved or deleted          Birth Statistics     Birth Weight Birth Length Birth FOC   Birth Weight: 3.355 kg (7 lb 6.3 oz) 48.3 cm (19\") (Filed from Delivery Summary)  34 cm (13.39\") (Filed from Delivery Summary)      Intake & Output     Intake  Patient Vitals for the past 24 hrs:   Breast Feeding (# of Times) LATCH Score   05/06/24 0830 -- 7   05/06/24 0940 1 --   05/06/24 1040 1 --   05/06/24 1400 1 --   05/06/24 1418 1 --   05/06/24 1520 1 --   05/06/24 1540 1 --   05/06/24 1620 1 --   05/06/24 1650 1 --   05/06/24 1719 1 --   05/06/24 2015 1 --   05/06/24 2052 1 --   05/06/24 2315 1 --   05/07/24 0200 1 --   05/07/24 0320 1 --   05/07/24 0425 1 --   05/07/24 0600 1 --         Output  Patient Vitals for the past 24 hrs:   Urine Occurrence   05/06/24 0940 1   05/06/24 1243 1   05/06/24 1400 1   05/06/24 1600 1   05/06/24 1805 1   05/07/24 0720 1          Vital Signs     Most Recent 24 Hour Range   Temp: 98.9 °F (37.2 °C)     Pulse: 146     Resp: 40  Temp  Min: 98.4 °F (36.9 °C)  Max: 99 °F (37.2 °C)    Pulse  Min: 132  Max: 146    Resp  Min: 36  Max: 44     Physical Exam     Birth Weight Current Weight Change since Birth (%)   Birth Weight: 3.355 kg (7 lb 6.3 oz) 3.137 kg (6 lb 14.7 oz)  -6%     General  Active and well-appearing infant.    HEENT  Anterior fontenelle soft and flat. ++ RR OU   Back   Symmetric, no evidence of spinal defect.   Lungs   Clear to auscultation bilaterally.    Chest Wall  Symmetric movement with respiration. No retractions.   Heart  Regular rate and rhythm, S1, S2 normal, no murmur.   Abdomen   Soft, non-tender. Bowel sounds active. No masses or organomegaly. 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 Social Determinants of Health     Financial Resource Strain:     Difficulty of Paying Living Expenses:    Food Insecurity:     Worried About Running Out of Food in the Last Year:     [address] in the Last Year:    Transportation Needs:     Lack of Transportation (Medical):  Lack of Transportation (Non-Medical):    Physical Activity:     Days of Exercise per Week:     Minutes of Exercise per Session:    Stress:     Feeling of Stress :    Social Connections:     Frequency of Communication with Friends and Family:     Frequency of Social Gatherings with Friends and Family:     Attends Anabaptist Services:     Active Member of Clubs or Organizations:     Attends Club or Organization Meetings:     Marital Status:    Intimate Partner Violence:     Fear of Current or Ex-Partner:     Emotionally Abused:     Physically Abused:     Sexually Abused:      No family history on file.   No Known Allergies  Current Outpatient Medications on File Prior to Visit   Medication Sig Dispense Refill    acetaminophen (TYLENOL) 325 MG tablet Take 650 mg by mouth every 6 hours as needed      leuprolide (LUPRON) 30 MG injection Inject 30 mg into the muscle      abiraterone acetate (ZYTIGA) 250 MG tablet Take 250 mg by mouth daily Take 4 tabs daily on empty stomach 1 hour prior or 2 hours after meal      predniSONE (DELTASONE) 5 MG tablet Take 5 mg by mouth daily After Zytiga      famotidine (PEPCID) 20 MG/2ML injection Infuse 2 mLs intravenously 2 times daily as needed (for GERD or Indigestion.) (Patient not taking: Reported on 10/13/2021) 60 each 0     No current facility-administered medications on file prior to visit. Review of Systems  Chills night sweats    Objective:   Pulse 88   Temp 97.5 °F (36.4 °C) (Temporal)   Resp 16   Ht 5' 11\" (1.803 m)   Wt 257 lb (116.6 kg)   SpO2 95%   BMI 35.84 kg/m²     ORTHOEXAM    Has been observed ambulating with a walker he is actually doing quite well he has no pain or discomfort in the hip his leg lengths look equal and observing him with a walker. Assessment:       Diagnosis Orders   1. Surgery follow-up  XR FEMUR RIGHT (MIN 2 VIEWS)   2. Closed fracture of neck of right femur with routine healing, subsequent encounter           Plan:   Look really good there from and advance him to weightbearing as tolerated. He understands this will take probably 2 weeks to be able to do. We will see him back in about 2 months for repeat films of his hip and 2 projections. He is comfortable with the plan. I am happy to see him back sooner should he have problems. We discussed outpatient therapy but he feels like he can do it on his own. Orders Placed This Encounter   Procedures    XR FEMUR RIGHT (MIN 2 VIEWS)     Standing Status:   Future     Number of Occurrences:   1     Standing Expiration Date:   10/13/2022     No orders of the defined types were placed in this encounter. No follow-ups on file.       Sin Reynoso MD

## (undated) DEVICE — SPONGE,LAP,18"X18",DLX,XR,ST,5/PK,40/PK: Brand: MEDLINE

## (undated) DEVICE — GOWN,AURORA,NONREINFORCED,LARGE: Brand: MEDLINE

## (undated) DEVICE — Device: Brand: COVER, PERINEAL POST, 12 PK

## (undated) DEVICE — C-ARM: Brand: UNBRANDED

## (undated) DEVICE — COUNTER NDL 40 COUNT HLD 70 FOAM BLK ADH W/ MAG

## (undated) DEVICE — APPLICATOR MEDICATED 26 CC SOLUTION HI LT ORNG CHLORAPREP

## (undated) DEVICE — GLOVE ORANGE PI 8   MSG9080

## (undated) DEVICE — LABEL MED MINI W/ MARKER

## (undated) DEVICE — DRAPE,ISOLATION,INCISE,INVISISHIELD: Brand: MEDLINE

## (undated) DEVICE — BANDAGE COBAN 4 IN COMPR W4INXL5YD FOAM COHESIVE QUIK STK SELF ADH SFT

## (undated) DEVICE — COVER LT HNDL BLU PLAS

## (undated) DEVICE — DRESSING FOAM 4X6 DISP POSTOP MEPILEX BORD AG

## (undated) DEVICE — DRESSING GZ W1XL8IN COT XRFRM N ADH OVERWRAP CURAD

## (undated) DEVICE — SUTURE VCRL SZ 2-0 L36IN ABSRB UD L36MM CT-1 1/2 CIR J945H

## (undated) DEVICE — INTENDED FOR TISSUE SEPARATION, AND OTHER PROCEDURES THAT REQUIRE A SHARP SURGICAL BLADE TO PUNCTURE OR CUT.: Brand: BARD-PARKER ® CARBON RIB-BACK BLADES

## (undated) DEVICE — PACK,BASIC IV,SIRUS: Brand: MEDLINE

## (undated) DEVICE — MARKER SURG SKIN GENTIAN VLT REG TIP W/ 6IN RUL

## (undated) DEVICE — SUTURE VCRL SZ 0 L36IN ABSRB UD L36MM CT-1 1/2 CIR J946H

## (undated) DEVICE — Device: Brand: PROTECTORS, LEG SPAR BALL JOINT, 12/PR

## (undated) DEVICE — PAD,ABDOMINAL,8"X10",ST,LF: Brand: MEDLINE

## (undated) DEVICE — GAUZE,SPONGE,FLUFF,6"X6.75",STRL,10/TRAY: Brand: MEDLINE

## (undated) DEVICE — ADHESIVE SKIN CLSR 0.7ML TOP DERMBND ADV

## (undated) DEVICE — 3M™ STERI-DRAPE™ U-DRAPE 1015: Brand: STERI-DRAPE™

## (undated) DEVICE — SUTURE STRATAFIX SPRL MCRYL + SZ 3-0 L18IN ABSRB UD PS-2 SXMP1B107

## (undated) DEVICE — GOWN,SIRUS,POLYRNF,BRTHSLV,XLN/XL,20/CS: Brand: MEDLINE

## (undated) DEVICE — DRAPE SURG UTIL 26X15 IN W/ TAPE N INVASIVE MULT LAYR DISP

## (undated) DEVICE — Device: Brand: BOOT LINER, DISPOSABLE

## (undated) DEVICE — ELECTRODE PT RET AD L9FT HI MOIST COND ADH HYDRGEL CORDED

## (undated) DEVICE — DRESSING FOAM POST OPERATIVE 4X10 IN MEPILEX BORDER AG

## (undated) DEVICE — SYRINGE IRRIG 60ML SFT PLIABLE BLB EZ TO GRP 1 HND USE W/

## (undated) DEVICE — GUIDEWIRE ORTH L400MM DIA3.2MM FOR TFN